# Patient Record
Sex: MALE | Race: WHITE | NOT HISPANIC OR LATINO | Employment: OTHER | RURAL
[De-identification: names, ages, dates, MRNs, and addresses within clinical notes are randomized per-mention and may not be internally consistent; named-entity substitution may affect disease eponyms.]

---

## 2023-11-06 DIAGNOSIS — K51.90 ULCERATIVE COLITIS WITHOUT COMPLICATIONS: Primary | ICD-10-CM

## 2023-11-10 ENCOUNTER — OFFICE VISIT (OUTPATIENT)
Dept: GASTROENTEROLOGY | Facility: CLINIC | Age: 33
End: 2023-11-10
Payer: OTHER GOVERNMENT

## 2023-11-10 VITALS
HEART RATE: 68 BPM | BODY MASS INDEX: 27.57 KG/M2 | SYSTOLIC BLOOD PRESSURE: 123 MMHG | WEIGHT: 208 LBS | HEIGHT: 73 IN | DIASTOLIC BLOOD PRESSURE: 69 MMHG

## 2023-11-10 DIAGNOSIS — K51.90 ULCERATIVE COLITIS WITHOUT COMPLICATIONS, UNSPECIFIED LOCATION: ICD-10-CM

## 2023-11-10 PROCEDURE — 99203 OFFICE O/P NEW LOW 30 MIN: CPT | Mod: PBBFAC | Performed by: NURSE PRACTITIONER

## 2023-11-10 PROCEDURE — 99204 OFFICE O/P NEW MOD 45 MIN: CPT | Mod: S$PBB,,, | Performed by: NURSE PRACTITIONER

## 2023-11-10 PROCEDURE — 99204 PR OFFICE/OUTPT VISIT, NEW, LEVL IV, 45-59 MIN: ICD-10-PCS | Mod: S$PBB,,, | Performed by: NURSE PRACTITIONER

## 2023-11-10 RX ORDER — MESALAMINE 1.2 G/1
2.4 TABLET, DELAYED RELEASE ORAL DAILY
COMMUNITY
Start: 2023-10-26

## 2023-11-10 NOTE — PROGRESS NOTES
Evaristo Ty is a 32 y.o. male here for No chief complaint on file.        PCP: Real Gama  Referring Provider: Real Gama,   [address] [place] [id number]     HPI:  Present for ulcerative colitis. Patient has a diagnosis of ulcerative colitis established by colonoscopy in 2022. Colonoscopy report from  [facility] reviewed, mild inflammation with aphthous ulcer in the cecum.  Low-grade indeterminate colitis on biopsy.  Patient also has had a small bowel capsule as well as EGD.  EGD was on 11/18/2022.  Patient reports that he is doing well with formed bowel movements.  Denies any hematochezia or melena.  No current abdominal pain.  No fevers.  No joint pain.  He is currently taking mesalamine 2.4 g once daily.  He is well controlled.  Patient is a  with the [employer].  Condition is controlled without any complications.          ROS:  Review of Systems   Constitutional:  Negative for activity change, appetite change, fatigue, fever and unexpected weight change.   Respiratory:  Negative for cough.    Cardiovascular:  Negative for chest pain.   Gastrointestinal:  Negative for abdominal distention, abdominal pain, blood in stool, change in bowel habit, constipation, diarrhea, nausea, vomiting and reflux.   Musculoskeletal:  Negative for gait problem.   Integumentary:  Negative for color change.   Psychiatric/Behavioral:  Negative for sleep disturbance. The patient is not nervous/anxious.           PMHX:  has a past medical history of Ulcerative colitis.    PSHX:  has a past surgical history that includes Colonoscopy (01/2023) and Esophagogastroduodenoscopy.    PFHX: family history includes Cancer in his maternal grandfather and maternal grandmother.    PSlHX:  reports that he has never smoked. He has never used smokeless tobacco. He reports that he does not currently use alcohol. He reports that he does not use drugs.        Review of patient's allergies indicates:  No Known  "Allergies    Medication List with Changes/Refills   Current Medications    MESALAMINE (LIALDA) 1.2 GRAM TBEC    Take 2.4 g by mouth once daily.        Objective Findings:  Vital Signs:  /69   Pulse 68   Ht 6' 1" (1.854 m)   Wt 94.3 kg (208 lb)   BMI 27.44 kg/m²  Body mass index is 27.44 kg/m².    Physical Exam:  Physical Exam  Vitals and nursing note reviewed.   Constitutional:       General: He is not in acute distress.     Appearance: Normal appearance.   HENT:      Mouth/Throat:      Mouth: Mucous membranes are moist.   Cardiovascular:      Rate and Rhythm: Normal rate.   Pulmonary:      Effort: Pulmonary effort is normal.      Breath sounds: No wheezing, rhonchi or rales.   Abdominal:      General: Bowel sounds are normal. There is no distension.      Palpations: Abdomen is soft. There is no mass.      Tenderness: There is no abdominal tenderness. There is no guarding.   Skin:     General: Skin is warm and dry.      Coloration: Skin is not jaundiced or pale.   Neurological:      Mental Status: He is alert and oriented to person, place, and time.   Psychiatric:         Mood and Affect: Mood normal.          Labs:  Lab Results   Component Value Date    WBC 4.50 11/10/2023    HGB 13.7 11/10/2023    HCT 39.8 (L) 11/10/2023    MCV 93.4 11/10/2023    RDW 12.8 11/10/2023     11/10/2023    LYMPH 44.2 (H) 11/10/2023    LYMPH 1.99 11/10/2023    MONO 13.1 (H) 11/10/2023    EOS 0.06 11/10/2023    BASO 0.04 11/10/2023     Lab Results   Component Value Date     11/10/2023    K 4.1 11/10/2023     11/10/2023    CO2 31 11/10/2023    GLU 90 11/10/2023    BUN 11 11/10/2023    CREATININE 0.98 11/10/2023    CALCIUM 9.6 11/10/2023    PROT 8.1 11/10/2023    ALBUMIN 4.1 11/10/2023    BILITOT 0.6 11/10/2023    ALKPHOS 61 11/10/2023    AST 33 11/10/2023    ALT 24 11/10/2023         Imaging: No results found.      Assessment:  Evaristo Ty is a 32 y.o. male here with:  1. Ulcerative colitis without " complications, unspecified location          Recommendations:  1. CBC, CMP, CRP, stool for calprotectin  2. Continue mesalamine 2.4 g daily  3. Avoid NSAID's  4. Follow up in 6 months    No follow-ups on file.      Order summary:  Orders Placed This Encounter    CBC Auto Differential    Comprehensive Metabolic Panel    C-Reactive Protein    Calprotectin, Stool       Thank you for allowing me to participate in the care of Evaristo Starks Karson.      WAYNE MckenzieC

## 2023-12-05 ENCOUNTER — PATIENT MESSAGE (OUTPATIENT)
Dept: GASTROENTEROLOGY | Facility: CLINIC | Age: 33
End: 2023-12-05

## 2023-12-05 NOTE — TELEPHONE ENCOUNTER
Patient asking the name of antibiotic you suggested for UC flair? I don't see any mention of that in your note. Just the mesalamine. Please advise?

## 2023-12-06 ENCOUNTER — PATIENT MESSAGE (OUTPATIENT)
Dept: GASTROENTEROLOGY | Facility: CLINIC | Age: 33
End: 2023-12-06

## 2023-12-20 ENCOUNTER — OFFICE VISIT (OUTPATIENT)
Dept: GASTROENTEROLOGY | Facility: CLINIC | Age: 33
End: 2023-12-20
Payer: OTHER GOVERNMENT

## 2023-12-20 VITALS
HEIGHT: 73 IN | BODY MASS INDEX: 27.14 KG/M2 | HEART RATE: 82 BPM | SYSTOLIC BLOOD PRESSURE: 133 MMHG | DIASTOLIC BLOOD PRESSURE: 77 MMHG | WEIGHT: 204.81 LBS

## 2023-12-20 DIAGNOSIS — K51.90 ULCERATIVE COLITIS WITHOUT COMPLICATIONS, UNSPECIFIED LOCATION: ICD-10-CM

## 2023-12-20 DIAGNOSIS — R10.32 LEFT LOWER QUADRANT ABDOMINAL PAIN: Primary | ICD-10-CM

## 2023-12-20 DIAGNOSIS — K51.919 ULCERATIVE COLITIS WITH COMPLICATION, UNSPECIFIED LOCATION: Primary | ICD-10-CM

## 2023-12-20 DIAGNOSIS — R10.32 LEFT LOWER QUADRANT ABDOMINAL PAIN: ICD-10-CM

## 2023-12-20 PROCEDURE — 99213 OFFICE O/P EST LOW 20 MIN: CPT | Mod: PBBFAC | Performed by: NURSE PRACTITIONER

## 2023-12-20 PROCEDURE — 99214 PR OFFICE/OUTPT VISIT, EST, LEVL IV, 30-39 MIN: ICD-10-PCS | Mod: S$PBB,,, | Performed by: NURSE PRACTITIONER

## 2023-12-20 PROCEDURE — 99214 OFFICE O/P EST MOD 30 MIN: CPT | Mod: S$PBB,,, | Performed by: NURSE PRACTITIONER

## 2023-12-20 RX ORDER — METRONIDAZOLE 500 MG/1
500 TABLET ORAL EVERY 12 HOURS
Qty: 20 TABLET | Refills: 0 | Status: SHIPPED | OUTPATIENT
Start: 2023-12-20 | End: 2023-12-30

## 2023-12-20 NOTE — PROGRESS NOTES
"    Evaristo Ty is a 33 y.o. male here for No chief complaint on file.        PCP: Real Gama  Referring Provider: No referring provider defined for this encounter.     HPI:  Presents for Ulcerative Colitis. States that he has been in a flare since the beginning of December.  States that he is unable to fly at this time due to abdominal pain.  Reports he is having left lower quadrant abdominal pain that is constant and worse at times.  States that it feels like he has been "hit in the gut".  States that this pain is not consistent with pain from previous flares.  He is currently taking mesalamine 4.8 g daily.  States that stool is loose at times and occasionally formed.Patient has a diagnosis of ulcerative colitis established by colonoscopy in 2022.  At the time of the colonoscopy the were some skip lesions in the cecum.  There was suspected Crohn's disease but the biopsy did not confirm.  Colonoscopy report from  [facility] reviewed, mild inflammation with aphthous ulcer in the cecum. Low-grade indeterminate colitis on biopsy. Patient also has had a small bowel capsule as well as EGD.  Small bowel capsule did not show any evidence of small-bowel disease at that time.  EGD was on 11/18/2022.  Reports that he was prescribed prednisone yesterday by the flight surgeon at [facility].  Has not yet started taking the medication.  We did discuss adding Flagyl twice daily for 10 days.  He did turn in a stool for calprotectin on yesterday but results have not returned.  Very minimal anemia on labs in November.  CRP was negative, liver enzymes were normal.          ROS:  Review of Systems   Constitutional:  Negative for activity change, appetite change, fatigue, fever and unexpected weight change.   HENT:  Negative for trouble swallowing.    Gastrointestinal:  Positive for abdominal pain and diarrhea. Negative for abdominal distention, blood in stool, change in bowel habit, constipation, nausea, vomiting and " "reflux.   Musculoskeletal:  Negative for gait problem.   Integumentary:  Negative for color change.   Psychiatric/Behavioral:  Negative for sleep disturbance. The patient is not nervous/anxious.           PMHX:  has a past medical history of Ulcerative colitis.    PSHX:  has a past surgical history that includes Colonoscopy (01/2023) and Esophagogastroduodenoscopy.    PFHX: family history includes Cancer in his maternal grandfather and maternal grandmother.    PSlHX:  reports that he has never smoked. He has never used smokeless tobacco. He reports that he does not currently use alcohol. He reports that he does not use drugs.        Review of patient's allergies indicates:  No Known Allergies    Medication List with Changes/Refills   New Medications    METRONIDAZOLE (FLAGYL) 500 MG TABLET    Take 1 tablet (500 mg total) by mouth every 12 (twelve) hours. for 10 days   Current Medications    MESALAMINE (LIALDA) 1.2 GRAM TBEC    Take 2.4 g by mouth once daily.        Objective Findings:  Vital Signs:  /77   Pulse 82   Ht 6' 1" (1.854 m)   Wt 92.9 kg (204 lb 12.8 oz)   BMI 27.02 kg/m²  Body mass index is 27.02 kg/m².    Physical Exam:  Physical Exam  Vitals and nursing note reviewed.   Constitutional:       General: He is not in acute distress.     Appearance: Normal appearance.   HENT:      Mouth/Throat:      Mouth: Mucous membranes are moist.   Cardiovascular:      Rate and Rhythm: Normal rate.   Pulmonary:      Effort: Pulmonary effort is normal.      Breath sounds: No wheezing, rhonchi or rales.   Abdominal:      General: Bowel sounds are normal. There is no distension.      Palpations: Abdomen is soft. There is no mass.      Tenderness: There is abdominal tenderness in the left lower quadrant. There is no guarding.   Skin:     General: Skin is warm and dry.      Coloration: Skin is not jaundiced or pale.   Neurological:      Mental Status: He is alert and oriented to person, place, and time. "   Psychiatric:         Mood and Affect: Mood normal.          Labs:  Lab Results   Component Value Date    WBC 9.65 12/20/2023    HGB 13.3 (L) 12/20/2023    HCT 39.4 (L) 12/20/2023    MCV 95.6 12/20/2023    RDW 12.7 12/20/2023     12/20/2023    LYMPH 19.0 (L) 12/20/2023    LYMPH 1.83 12/20/2023    MONO 10.5 (H) 12/20/2023    EOS 0.10 12/20/2023    BASO 0.05 12/20/2023     Lab Results   Component Value Date     11/10/2023    K 4.1 11/10/2023     11/10/2023    CO2 31 11/10/2023    GLU 90 11/10/2023    BUN 11 11/10/2023    CREATININE 0.98 11/10/2023    CALCIUM 9.6 11/10/2023    PROT 8.1 11/10/2023    ALBUMIN 4.1 11/10/2023    BILITOT 0.6 11/10/2023    ALKPHOS 61 11/10/2023    AST 33 11/10/2023    ALT 24 11/10/2023         Imaging: No results found.      Assessment:  Evaristo Ty is a 33 y.o. male here with:  1. Ulcerative colitis with complication, unspecified location    2. Ulcerative colitis without complications, unspecified location    3. Left lower quadrant abdominal pain          Recommendations:  1. MR enterography  2. Continue mesalamine 4.8 g daily  3. Flagyl 500 mg b.i.d. times 10 days  4. May consider taking prednisone  5. Is not taking NSAID's  6. Labs below today    Follow up in about 4 weeks (around 1/17/2024).      Order summary:  Orders Placed This Encounter    CT Abdomen Pelvis With IV Contrast Routine Oral Contrast    CBC Auto Differential    C-Reactive Protein    Comprehensive Metabolic Panel    Iron and TIBC    Ferritin    metroNIDAZOLE (FLAGYL) 500 MG tablet       Thank you for allowing me to participate in the care of Evaristo Ty.      DANAE Mckenzie

## 2023-12-21 ENCOUNTER — PATIENT MESSAGE (OUTPATIENT)
Dept: GASTROENTEROLOGY | Facility: CLINIC | Age: 33
End: 2023-12-21

## 2023-12-21 DIAGNOSIS — D50.9 IRON DEFICIENCY ANEMIA, UNSPECIFIED IRON DEFICIENCY ANEMIA TYPE: Primary | ICD-10-CM

## 2023-12-21 DIAGNOSIS — K51.919 ULCERATIVE COLITIS WITH COMPLICATION, UNSPECIFIED LOCATION: ICD-10-CM

## 2023-12-28 ENCOUNTER — ANESTHESIA (OUTPATIENT)
Dept: GASTROENTEROLOGY | Facility: HOSPITAL | Age: 33
End: 2023-12-28
Payer: OTHER GOVERNMENT

## 2023-12-28 ENCOUNTER — ANESTHESIA EVENT (OUTPATIENT)
Dept: GASTROENTEROLOGY | Facility: HOSPITAL | Age: 33
End: 2023-12-28
Payer: OTHER GOVERNMENT

## 2023-12-28 ENCOUNTER — HOSPITAL ENCOUNTER (OUTPATIENT)
Dept: GASTROENTEROLOGY | Facility: HOSPITAL | Age: 33
Discharge: HOME | End: 2023-12-28
Attending: NURSE PRACTITIONER
Payer: OTHER GOVERNMENT

## 2023-12-28 VITALS
DIASTOLIC BLOOD PRESSURE: 65 MMHG | SYSTOLIC BLOOD PRESSURE: 114 MMHG | RESPIRATION RATE: 13 BRPM | TEMPERATURE: 98 F | OXYGEN SATURATION: 99 % | HEART RATE: 65 BPM

## 2023-12-28 DIAGNOSIS — D50.9 IRON DEFICIENCY ANEMIA, UNSPECIFIED IRON DEFICIENCY ANEMIA TYPE: ICD-10-CM

## 2023-12-28 DIAGNOSIS — K52.9 IBD (INFLAMMATORY BOWEL DISEASE): Primary | ICD-10-CM

## 2023-12-28 PROCEDURE — 88305 TISSUE EXAM BY PATHOLOGIST: CPT | Mod: 26,,, | Performed by: PATHOLOGY

## 2023-12-28 PROCEDURE — 00811 ANES LWR INTST NDSC NOS: CPT

## 2023-12-28 PROCEDURE — D9220A PRA ANESTHESIA: ICD-10-PCS | Mod: ,,, | Performed by: NURSE ANESTHETIST, CERTIFIED REGISTERED

## 2023-12-28 PROCEDURE — 45380 PR COLONOSCOPY,BIOPSY: ICD-10-PCS | Mod: ,,, | Performed by: INTERNAL MEDICINE

## 2023-12-28 PROCEDURE — 27000716 HC OXISENSOR PROBE, ANY SIZE: Performed by: NURSE ANESTHETIST, CERTIFIED REGISTERED

## 2023-12-28 PROCEDURE — 25000003 PHARM REV CODE 250: Performed by: NURSE ANESTHETIST, CERTIFIED REGISTERED

## 2023-12-28 PROCEDURE — D9220A PRA ANESTHESIA: Mod: ,,, | Performed by: NURSE ANESTHETIST, CERTIFIED REGISTERED

## 2023-12-28 PROCEDURE — 45380 COLONOSCOPY AND BIOPSY: CPT | Mod: ,,, | Performed by: INTERNAL MEDICINE

## 2023-12-28 PROCEDURE — 37000008 HC ANESTHESIA 1ST 15 MINUTES

## 2023-12-28 PROCEDURE — 88305 TISSUE EXAM BY PATHOLOGIST: CPT | Mod: TC,SUR | Performed by: INTERNAL MEDICINE

## 2023-12-28 PROCEDURE — 45380 COLONOSCOPY AND BIOPSY: CPT | Performed by: INTERNAL MEDICINE

## 2023-12-28 PROCEDURE — 63600175 PHARM REV CODE 636 W HCPCS: Performed by: NURSE ANESTHETIST, CERTIFIED REGISTERED

## 2023-12-28 PROCEDURE — 27000284 HC CANNULA NASAL: Performed by: NURSE ANESTHETIST, CERTIFIED REGISTERED

## 2023-12-28 PROCEDURE — 88305 SURGICAL PATHOLOGY: ICD-10-PCS | Mod: 26,,, | Performed by: PATHOLOGY

## 2023-12-28 PROCEDURE — 27201423 OPTIME MED/SURG SUP & DEVICES STERILE SUPPLY

## 2023-12-28 PROCEDURE — 37000009 HC ANESTHESIA EA ADD 15 MINS

## 2023-12-28 RX ORDER — LIDOCAINE HYDROCHLORIDE 20 MG/ML
INJECTION, SOLUTION EPIDURAL; INFILTRATION; INTRACAUDAL; PERINEURAL
Status: DISCONTINUED | OUTPATIENT
Start: 2023-12-28 | End: 2023-12-28

## 2023-12-28 RX ORDER — PROPOFOL 10 MG/ML
VIAL (ML) INTRAVENOUS
Status: DISCONTINUED | OUTPATIENT
Start: 2023-12-28 | End: 2023-12-28

## 2023-12-28 RX ORDER — SODIUM CHLORIDE 0.9 % (FLUSH) 0.9 %
10 SYRINGE (ML) INJECTION
Status: DISCONTINUED | OUTPATIENT
Start: 2023-12-28 | End: 2023-12-29 | Stop reason: HOSPADM

## 2023-12-28 RX ADMIN — SODIUM CHLORIDE: 9 INJECTION, SOLUTION INTRAVENOUS at 09:12

## 2023-12-28 RX ADMIN — PROPOFOL 100 MG: 10 INJECTION, EMULSION INTRAVENOUS at 09:12

## 2023-12-28 RX ADMIN — LIDOCAINE HYDROCHLORIDE 100 MG: 20 INJECTION, SOLUTION INTRAVENOUS at 09:12

## 2023-12-28 NOTE — H&P
[facility]  Gastroenterology  H&P    Patient Name: Evaristo Ty  MRN: [id number]  Admission Date: 12/28/2023  Code Status: No Order    Attending Provider: Charla Madrid FNP   Primary Care Physician: Real Gama DO  Principal Problem:<principal problem not specified>    Subjective:     History of Present Illness: Pt has IBD with flare and iron deficiency anemia; for colonoscopy.    Past Medical History:   Diagnosis Date    Ulcerative colitis        Past Surgical History:   Procedure Laterality Date    COLONOSCOPY  01/2023    ESOPHAGOGASTRODUODENOSCOPY         Review of patient's allergies indicates:  No Known Allergies  Family History       Problem Relation (Age of Onset)    Cancer Maternal Grandmother, Maternal Grandfather          Tobacco Use    Smoking status: Never    Smokeless tobacco: Never   Substance and Sexual Activity    Alcohol use: Not Currently    Drug use: Never    Sexual activity: Not on file     Review of Systems   Respiratory: Negative.     Cardiovascular: Negative.    Gastrointestinal:  Positive for abdominal pain and diarrhea.     Objective:     Vital Signs (Most Recent):    Vital Signs (24h Range):           There is no height or weight on file to calculate BMI.    No intake or output data in the 24 hours ending 12/28/23 0858    Lines/Drains/Airways       None                   Physical Exam  Vitals reviewed.   Constitutional:       General: He is not in acute distress.     Appearance: Normal appearance. He is well-developed. He is not ill-appearing.   HENT:      Head: Normocephalic and atraumatic.      Nose: Nose normal.   Eyes:      Pupils: Pupils are equal, round, and reactive to light.   Cardiovascular:      Rate and Rhythm: Normal rate and regular rhythm.   Pulmonary:      Effort: Pulmonary effort is normal.      Breath sounds: Normal breath sounds. No wheezing.   Abdominal:      General: Abdomen is flat. Bowel sounds are normal. There is no distension.       "Palpations: Abdomen is soft.      Tenderness: There is no abdominal tenderness. There is no guarding.   Skin:     General: Skin is warm and dry.      Coloration: Skin is not jaundiced.   Neurological:      Mental Status: He is alert.   Psychiatric:         Attention and Perception: Attention normal.         Mood and Affect: Affect normal.         Speech: Speech normal.         Behavior: Behavior is cooperative.      Comments: Pt was calm while speaking.         Significant Labs:  CBC: No results for input(s): "WBC", "HGB", "HCT", "PLT" in the last 48 hours.  CMP: No results for input(s): "GLU", "CALCIUM", "ALBUMIN", "PROT", "NA", "K", "CO2", "CL", "BUN", "CREATININE", "ALKPHOS", "ALT", "AST", "BILITOT" in the last 48 hours.    Significant Imaging:  Imaging results within the past 24 hours have been reviewed.    Assessment/Plan:     There are no hospital problems to display for this patient.        Imp: IBD, iron deficiency anemia  Plan: colonoscopy    Nathen Morris MD  Gastroenterology  [facility]  "

## 2023-12-28 NOTE — ANESTHESIA PREPROCEDURE EVALUATION
12/28/2023  Evaristo Ty is a 33 y.o., male.      Pre-op Assessment    I have reviewed the Patient Summary Reports.     I have reviewed the Nursing Notes. I have reviewed the NPO Status.   I have reviewed the Medications.     Review of Systems  Hematology/Oncology:  Hematology Normal   Oncology Normal                                   EENT/Dental:  EENT/Dental Normal           Cardiovascular:  Cardiovascular Normal                                            Pulmonary:  Pulmonary Normal                       Renal/:  Renal/ Normal                 Hepatic/GI:   PUD,     Ulcerative colitis          Musculoskeletal:  Musculoskeletal Normal                Neurological:  Neurology Normal                                      Endocrine:  Endocrine Normal            Dermatological:  Skin Normal    Psych:  Psychiatric Normal                    Physical Exam  General: Well nourished, Cooperative, Alert and Oriented    Airway:  Mallampati: II   Mouth Opening: Normal  TM Distance: Normal  Tongue: Normal  Neck ROM: Normal ROM    Dental:  Intact        Anesthesia Plan  Type of Anesthesia, risks & benefits discussed:    Anesthesia Type: Gen Natural Airway, MAC  Intra-op Monitoring Plan: Standard ASA Monitors  Post Op Pain Control Plan: multimodal analgesia and IV/PO Opioids PRN  Induction:  IV  Informed Consent: Informed consent signed with the Patient and all parties understand the risks and agree with anesthesia plan.  All questions answered. Patient consented to blood products? Yes  ASA Score: 2  Day of Surgery Review of History & Physical: I have interviewed and examined the patient. I have reviewed the patient's H&P dated: There are no significant changes.     Ready For Surgery From Anesthesia Perspective.     .  Past Medical History:   Diagnosis Date    Ulcerative colitis        Past Surgical History:    Procedure Laterality Date    COLONOSCOPY  01/2023    ESOPHAGOGASTRODUODENOSCOPY         Family History   Problem Relation Age of Onset    Cancer Maternal Grandmother     Cancer Maternal Grandfather        Social History     Socioeconomic History    Marital status:    Tobacco Use    Smoking status: Never    Smokeless tobacco: Never   Substance and Sexual Activity    Alcohol use: Not Currently    Drug use: Never       Current Outpatient Medications   Medication Sig Dispense Refill    mesalamine (LIALDA) 1.2 gram TbEC Take 2.4 g by mouth once daily.      metroNIDAZOLE (FLAGYL) 500 MG tablet Take 1 tablet (500 mg total) by mouth every 12 (twelve) hours. for 10 days 20 tablet 0     No current facility-administered medications for this encounter.       Review of patient's allergies indicates:  No Known Allergies     Patient Active Problem List   Diagnosis    Ulcerative colitis with complication    Left lower quadrant abdominal pain

## 2023-12-28 NOTE — DISCHARGE INSTRUCTIONS
Procedure Date  12/28/23     Impression  Overall Impression:   Performed forceps biopsies in the terminal ileum  Performed forceps biopsies in the ascending colon, transverse colon, descending colon, sigmoid colon and rectum  Mucosa of the colon and terminal ileum is normal appearing; multiple biopsies were obtained.     Recommendation    Await pathology results     Repeat colonoscopy in 5 years      Resume mesalamine;     return to GI clinic after MR enterography. - Jan. 17, 2024 @ 3:00 pm     Avoid nsaids.  Take otc iron 325mg/day.    Discharge: disp; DC to home. Resume diet. No driving x 24 hours. F/U with PCP and GI clinic.  Dx: IBD with no gross evidence of colitis.    No driving today, no operating heavy machinery, no signing any legal documents until tomorrow.    Drink lots of fluids, resume regular diet.  Take your normal medications.     Please call our office for any nausea, vomiting or abdominal pain.

## 2023-12-28 NOTE — TRANSFER OF CARE
Anesthesia Transfer of Care Note    Patient: Evaristo Ty    Procedure(s) Performed: * No procedures listed *    Patient location: GI    Anesthesia Type: general    Transport from OR: Transported from OR on room air with adequate spontaneous ventilation. Continuous ECG monitoring in transport. Continuous SpO2 monitoring in transport    Post pain: adequate analgesia    Post assessment: no apparent anesthetic complications    Post vital signs: stable    Level of consciousness: sedated and responds to stimulation    Nausea/Vomiting: no nausea/vomiting    Complications: none    Transfer of care protocol was followedComments: Good SV continue, NAD, VSS, RTRN      Last vitals: Visit Vitals  BP 94/60 (BP Location: Left arm, Patient Position: Lying)   Pulse 68   Temp 36.5 °C (97.7 °F) (Oral)   Resp 16   SpO2 96%

## 2023-12-28 NOTE — ANESTHESIA POSTPROCEDURE EVALUATION
Anesthesia Post Evaluation    Patient: Evaristo Ty    Procedure(s) Performed: Colonoscopy    Final Anesthesia Type: general      Patient location during evaluation: GI PACU  Patient participation: Yes- Able to Participate  Level of consciousness: awake and alert  Post-procedure vital signs: reviewed and stable  Pain management: adequate  Airway patency: patent    PONV status at discharge: No PONV  Anesthetic complications: no      Cardiovascular status: blood pressure returned to baseline and hemodynamically stable  Respiratory status: spontaneous ventilation  Hydration status: euvolemic  Follow-up not needed.  Comments: Refer to nursing notes for pain/esther score upon discharge from recovery.              Vitals Value Taken Time   /68 12/28/23 0957   Temp 97.6 12/28/23 1302   Pulse 66 12/28/23 0958   Resp 18 12/28/23 0958   SpO2 98 % 12/28/23 0958   Vitals shown include unvalidated device data.      Event Time   Out of Recovery 10:35:13         Pain/Esther Score: Esther Score: 9 (12/28/2023  9:38 AM)

## 2023-12-29 LAB
ESTROGEN SERPL-MCNC: NORMAL PG/ML
INSULIN SERPL-ACNC: NORMAL U[IU]/ML
LAB AP GROSS DESCRIPTION: NORMAL
LAB AP LABORATORY NOTES: NORMAL
T3RU NFR SERPL: NORMAL %

## 2024-01-02 ENCOUNTER — PATIENT MESSAGE (OUTPATIENT)
Dept: GASTROENTEROLOGY | Facility: CLINIC | Age: 34
End: 2024-01-02
Payer: OTHER GOVERNMENT

## 2024-01-03 ENCOUNTER — PATIENT MESSAGE (OUTPATIENT)
Dept: GASTROENTEROLOGY | Facility: CLINIC | Age: 34
End: 2024-01-03
Payer: OTHER GOVERNMENT

## 2024-01-17 ENCOUNTER — OFFICE VISIT (OUTPATIENT)
Dept: GASTROENTEROLOGY | Facility: CLINIC | Age: 34
End: 2024-01-17
Payer: OTHER GOVERNMENT

## 2024-01-17 VITALS
HEIGHT: 73 IN | HEART RATE: 74 BPM | DIASTOLIC BLOOD PRESSURE: 69 MMHG | BODY MASS INDEX: 27.41 KG/M2 | SYSTOLIC BLOOD PRESSURE: 142 MMHG | WEIGHT: 206.81 LBS

## 2024-01-17 DIAGNOSIS — K51.919 ULCERATIVE COLITIS WITH COMPLICATION, UNSPECIFIED LOCATION: Primary | ICD-10-CM

## 2024-01-17 DIAGNOSIS — D64.9 ANEMIA, UNSPECIFIED TYPE: ICD-10-CM

## 2024-01-17 DIAGNOSIS — D50.9 IRON DEFICIENCY ANEMIA, UNSPECIFIED IRON DEFICIENCY ANEMIA TYPE: ICD-10-CM

## 2024-01-17 DIAGNOSIS — R10.32 LEFT LOWER QUADRANT ABDOMINAL PAIN: ICD-10-CM

## 2024-01-17 PROCEDURE — 99213 OFFICE O/P EST LOW 20 MIN: CPT | Mod: PBBFAC | Performed by: NURSE PRACTITIONER

## 2024-01-17 PROCEDURE — 99214 OFFICE O/P EST MOD 30 MIN: CPT | Mod: S$PBB,,, | Performed by: NURSE PRACTITIONER

## 2024-01-17 RX ORDER — FEXOFENADINE HYDROCHLORIDE 180 MG/1
180 TABLET, FILM COATED ORAL DAILY
COMMUNITY
Start: 2023-11-16

## 2024-01-17 NOTE — PROGRESS NOTES
Evaristo Ty is a 33 y.o. male here for No chief complaint on file.        PCP: Real Gama  Referring Provider: No referring provider defined for this encounter.     HPI:  Presents for follow-up after colonoscopy.  Colonoscopy on 12/28/2023, report reviewed, negative for colitis in the ascending, transverse, descending, sigmoid, and rectum.  No inflammation noted.  Patient also had MRI enterography on 12/29, under distention noted in the rectum.  This was following colonoscopy the day prior.  No inflammation was noted in the rectum.  Previous calprotectin was 106.  CRP on 12/20 was 4.19.  He did have iron-deficiency.  Total iron level was 34 and saturation was 12.  Minimal anemia with HGB 13.3 and HCT 39.4.  Patient has been keeping a diary of bowel movements.  Reports 2-3 bowel movements per day.  Stool is formed.  He reports intermittent abdominal bloating and gas.  Associates some triggers including stress and carbonated drinks.  One week prior to colonoscopy he was started on prednisone 20 mg daily.  Feels that this may have reduced inflammation.  Patient describes intense intermittent flares of abdominal pain.  Last flare several weeks ago.  States that it was triggered by drinking a carbonated drink.  Pain was described as burning and generalized in the entire abdomen.  Reports that he has following a bland diet.  No previous association with gluten sensitivity.  No known testing for celiac disease.  No current hematochezia or melena.  He is taking mesalamine 2.4 g daily. Patient has a diagnosis of ulcerative colitis established by colonoscopy in 2022.  At the time of the colonoscopy the were some skip lesions in the cecum.  There was suspected Crohn's disease but the biopsy did not confirm.  Colonoscopy report from  [facility] reviewed, mild inflammation with aphthous ulcer in the cecum. Low-grade indeterminate colitis on biopsy. Patient also has had a small bowel capsule as well as  "EGD.  Small bowel capsule did not show any evidence of small-bowel disease at that time.  EGD was on 11/18/2022.           ROS:  Review of Systems   Constitutional:  Negative for activity change, appetite change, fatigue, fever and unexpected weight change.   HENT:  Negative for trouble swallowing.    Gastrointestinal:  Positive for abdominal distention (intermittent gas bloating) and abdominal pain (intermittent). Negative for blood in stool, change in bowel habit, constipation, diarrhea, nausea, vomiting and reflux.   Musculoskeletal:  Negative for gait problem.   Integumentary:  Negative for color change.   Psychiatric/Behavioral:  Negative for sleep disturbance. The patient is not nervous/anxious.           PMHX:  has a past medical history of Ulcerative colitis.    PSHX:  has a past surgical history that includes Colonoscopy (01/2023) and Esophagogastroduodenoscopy.    PFHX: family history includes Cancer in his maternal grandfather and maternal grandmother.    PSlHX:  reports that he has never smoked. He has never used smokeless tobacco. He reports that he does not currently use alcohol. He reports that he does not use drugs.        Review of patient's allergies indicates:  No Known Allergies    Medication List with Changes/Refills   Current Medications    ALLEGRA ALLERGY 180 MG TABLET    Take 180 mg by mouth once daily.    MESALAMINE (LIALDA) 1.2 GRAM TBEC    Take 2.4 g by mouth once daily.        Objective Findings:  Vital Signs:  BP (!) 142/69   Pulse 74   Ht 6' 1" (1.854 m)   Wt 93.8 kg (206 lb 12.8 oz)   BMI 27.28 kg/m²  Body mass index is 27.28 kg/m².    Physical Exam:  Physical Exam  Vitals and nursing note reviewed.   Constitutional:       General: He is not in acute distress.     Appearance: Normal appearance.   HENT:      Mouth/Throat:      Mouth: Mucous membranes are moist.   Cardiovascular:      Rate and Rhythm: Normal rate.   Pulmonary:      Effort: Pulmonary effort is normal.      Breath " sounds: No wheezing, rhonchi or rales.   Abdominal:      General: Bowel sounds are normal. There is no distension.      Palpations: Abdomen is soft. There is no mass.      Tenderness: There is no abdominal tenderness. There is no guarding.      Hernia: No hernia is present.   Skin:     General: Skin is warm and dry.      Coloration: Skin is not jaundiced or pale.   Neurological:      Mental Status: He is alert and oriented to person, place, and time.   Psychiatric:         Mood and Affect: Mood normal.          Labs:  Lab Results   Component Value Date    WBC 9.65 12/20/2023    HGB 13.3 (L) 12/20/2023    HCT 39.4 (L) 12/20/2023    MCV 95.6 12/20/2023    RDW 12.7 12/20/2023     12/20/2023    LYMPH 19.0 (L) 12/20/2023    LYMPH 1.83 12/20/2023    MONO 10.5 (H) 12/20/2023    EOS 0.10 12/20/2023    BASO 0.05 12/20/2023     Lab Results   Component Value Date     12/20/2023    K 4.2 12/20/2023     12/20/2023    CO2 27 12/20/2023    GLU 97 12/20/2023    BUN 13 12/20/2023    CREATININE 1.11 12/20/2023    CALCIUM 9.5 12/20/2023    PROT 8.0 12/20/2023    ALBUMIN 3.9 12/20/2023    BILITOT 0.3 12/20/2023    ALKPHOS 83 12/20/2023    AST 26 12/20/2023    ALT 17 12/20/2023         Imaging: MRI Enterography (XPD)    Result Date: 12/29/2023  EXAMINATION: MRI ENTEROGRAPHY (XPD) CLINICAL HISTORY: Left lower quadrant pain COMPARISON: None TECHNIQUE: Multiplanar multisequence magnetic resonance imaging of the abdomen and pelvis performed before and after the administration of 19 cc MultiHance intravenous contrast.  MRI enterography protocol followed. FINDINGS: No worrisome focal hepatic abnormality demonstrated on submitted images.  Visualized gallbladder grossly unremarkable.  Visualized pancreas appears unremarkable.  Spleen grossly unremarkable.  Bilateral adrenal glands grossly unremarkable.  Bilateral kidneys appear grossly unremarkable.  Urinary bladder incompletely distended.  Prostate and seminal vesicles  grossly unremarkable.  No convincing evidence of gastrointestinal obstruction or acute appendicitis.  There is some under distension of the rectum with some mucosal enhancement suspicious for proctitis.  Consider colonoscopy.  No other convincing abnormal bowel wall thickening or enhancement .  Vasculature grossly unremarkable.  Visualized osseous and surrounding soft tissue structures demonstrate no acute abnormality.     There is some under distension of the rectum with some mucosal enhancement suspicious for proctitis.  Consider colonoscopy. No other convincing abnormal bowel wall thickening or enhancement . Point of Service: [facility] Electronically signed by: Iain Benítez Date:    12/29/2023 Time:    11:59    Colonoscopy    Result Date: 12/28/2023  Table formatting from the original result was not included. Procedure Date 12/28/23 Impression Overall      Performed forceps biopsies in the terminal ileum Performed forceps biopsies in the ascending colon, transverse colon, descending colon, sigmoid colon and rectum Mucosa of the colon and terminal ileum is normal appearing; multiple biopsies were obtained. Recommendation  Await pathology results  Repeat colonoscopy in 5 years Resume mesalamine; return to GI clinic after MR enterography. Avoid nsaids.  Take otc iron 325mg/day. Discharge: disp; DC to home. Resume diet. No driving x 24 hours. F/U with PCP and GI clinic. Dx: IBD with no gross evidence of colitis. Indication Iron deficiency anemia, unspecified iron deficiency anemia type Providers Neo Callejas CRNA CRNA Cargile, Amber N Technician Shanna Frankel RN Registered Nurse Nathen Morris MD Proceduralist Medications Moderate sedation administered by anesthesia staff - See anesthesia record. Preprocedure A history and physical has been performed, and patient medication allergies have been reviewed. The patient's tolerance of previous anesthesia has been reviewed. The risks and  benefits of the procedure and the sedation options and risks were discussed with the patient. All questions were answered and informed consent obtained. ASA Score: ASA 2 - Patient with mild systemic disease with no functional limitations Mallampati Airway Score: II (hard and soft palate, upper portion of tonsils anduvula visible) Details of the Procedure The patient underwent monitored anesthesia care, which was administered by an anesthesia professional. The patient's heart rate, blood pressure, level of consciousness, respirations, oxygen, ECG and ETCO2 were monitored throughout the procedure. A digital rectal exam was performed. A perianal exam was performed. The scope was introduced through the anus and advanced to the terminal ileum. Retroflexion was performed in the rectum. The quality of bowel preparation was evaluated using the [place] Bowel Preparation Scale with scores of: right colon = 2, transverse colon = 2, left colon = 2. The total BBPS score was 6. Bowel prep was adequate. The patient's estimated blood loss was minimal (<5 mL). The procedure was not difficult. The patient tolerated the procedure well. There were no apparent adverse events. Scope: Colonoscope Scope Serial: [id number] Events Procedure Events Event Event Time Procedure Events Event Event Time ENDO SCOPE IN TIME 12/28/2023  9:17 AM ENDO CECUM REACHED 12/28/2023  9:25 AM ENDO SCOPE OUT TIME 12/28/2023  9:33 AM CECAL WITHDRAWAL TIME: 8m 01s Findings Performed multiple forceps biopsies in the terminal ileum Performed multiple forceps biopsies in the ascending colon, transverse colon, descending colon, sigmoid colon and rectum         Assessment:  Evaristo Ty is a 33 y.o. male here with:  1. Ulcerative colitis with complication, unspecified location    2. Iron deficiency anemia, unspecified iron deficiency anemia type    3. Left lower quadrant abdominal pain    4. Anemia, unspecified type          Recommendations:  1. Celiac labs,  iron studies, H and H, CRP  2. Avoid NSAID's  3. Continue mesalamine at 2.4 g daily  4. Consider further evaluation following lab review    Follow up in about 4 weeks (around 2/14/2024).      Order summary:  Orders Placed This Encounter    Iron and TIBC    Ferritin    Hemoglobin and Hematocrit    C-Reactive Protein    IgA    Tissue Transglutaminase Ab, IgA    Endomysial antibody, IgA titer    Gliadin (Deamidated) Ab, Eval       Thank you for allowing me to participate in the care of Evaristo Ty.      WAYNE MckenzieC

## 2024-01-18 DIAGNOSIS — D50.9 IRON DEFICIENCY ANEMIA, UNSPECIFIED IRON DEFICIENCY ANEMIA TYPE: Primary | ICD-10-CM

## 2024-01-20 ENCOUNTER — PATIENT MESSAGE (OUTPATIENT)
Dept: GASTROENTEROLOGY | Facility: CLINIC | Age: 34
End: 2024-01-20
Payer: OTHER GOVERNMENT

## 2024-01-20 ENCOUNTER — HOSPITAL ENCOUNTER (EMERGENCY)
Facility: HOSPITAL | Age: 34
Discharge: HOME | End: 2024-01-20
Attending: EMERGENCY MEDICINE
Payer: OTHER GOVERNMENT

## 2024-01-20 VITALS
HEIGHT: 73 IN | BODY MASS INDEX: 27.3 KG/M2 | OXYGEN SATURATION: 97 % | TEMPERATURE: 98 F | RESPIRATION RATE: 14 BRPM | WEIGHT: 206 LBS | DIASTOLIC BLOOD PRESSURE: 70 MMHG | SYSTOLIC BLOOD PRESSURE: 123 MMHG | HEART RATE: 74 BPM

## 2024-01-20 DIAGNOSIS — K52.9 INFLAMMATORY BOWEL DISEASE: ICD-10-CM

## 2024-01-20 DIAGNOSIS — R10.9 ABDOMINAL PAIN, UNSPECIFIED ABDOMINAL LOCATION: Primary | ICD-10-CM

## 2024-01-20 LAB
ALBUMIN SERPL BCP-MCNC: 3.5 G/DL (ref 3.5–5)
ALBUMIN/GLOB SERPL: 0.8 {RATIO}
ALP SERPL-CCNC: 100 U/L (ref 45–115)
ALT SERPL W P-5'-P-CCNC: 28 U/L (ref 16–61)
ANION GAP SERPL CALCULATED.3IONS-SCNC: 8 MMOL/L (ref 7–16)
AST SERPL W P-5'-P-CCNC: 31 U/L (ref 15–37)
BASOPHILS # BLD AUTO: 0.04 K/UL (ref 0–0.2)
BASOPHILS NFR BLD AUTO: 0.3 % (ref 0–1)
BILIRUB SERPL-MCNC: 0.3 MG/DL (ref ?–1.2)
BUN SERPL-MCNC: 17 MG/DL (ref 7–18)
BUN/CREAT SERPL: 19 (ref 6–20)
CALCIUM SERPL-MCNC: 9.5 MG/DL (ref 8.5–10.1)
CHLORIDE SERPL-SCNC: 106 MMOL/L (ref 98–107)
CO2 SERPL-SCNC: 27 MMOL/L (ref 21–32)
CREAT SERPL-MCNC: 0.91 MG/DL (ref 0.7–1.3)
CRP SERPL-MCNC: 9.73 MG/DL (ref 0–0.8)
DIFFERENTIAL METHOD BLD: ABNORMAL
EGFR (NO RACE VARIABLE) (RUSH/TITUS): 113 ML/MIN/1.73M2
EOSINOPHIL # BLD AUTO: 0.09 K/UL (ref 0–0.5)
EOSINOPHIL NFR BLD AUTO: 0.8 % (ref 1–4)
ERYTHROCYTE [DISTWIDTH] IN BLOOD BY AUTOMATED COUNT: 12.3 % (ref 11.5–14.5)
GLOBULIN SER-MCNC: 4.2 G/DL (ref 2–4)
GLUCOSE SERPL-MCNC: 98 MG/DL (ref 74–106)
HCT VFR BLD AUTO: 38.1 % (ref 40–54)
HGB BLD-MCNC: 13.1 G/DL (ref 13.5–18)
IMM GRANULOCYTES # BLD AUTO: 0.05 K/UL (ref 0–0.04)
IMM GRANULOCYTES NFR BLD: 0.4 % (ref 0–0.4)
LIPASE SERPL-CCNC: 35 U/L (ref 16–77)
LYMPHOCYTES # BLD AUTO: 1.59 K/UL (ref 1–4.8)
LYMPHOCYTES NFR BLD AUTO: 13.7 % (ref 27–41)
MCH RBC QN AUTO: 31.7 PG (ref 27–31)
MCHC RBC AUTO-ENTMCNC: 34.4 G/DL (ref 32–36)
MCV RBC AUTO: 92.3 FL (ref 80–96)
MONOCYTES # BLD AUTO: 1.18 K/UL (ref 0–0.8)
MONOCYTES NFR BLD AUTO: 10.2 % (ref 2–6)
MPC BLD CALC-MCNC: 9.5 FL (ref 9.4–12.4)
NEUTROPHILS # BLD AUTO: 8.64 K/UL (ref 1.8–7.7)
NEUTROPHILS NFR BLD AUTO: 74.6 % (ref 53–65)
NRBC # BLD AUTO: 0 X10E3/UL
NRBC, AUTO (.00): 0 %
PLATELET # BLD AUTO: 261 K/UL (ref 150–400)
POTASSIUM SERPL-SCNC: 3.6 MMOL/L (ref 3.5–5.1)
PROT SERPL-MCNC: 7.7 G/DL (ref 6.4–8.2)
RBC # BLD AUTO: 4.13 M/UL (ref 4.6–6.2)
SODIUM SERPL-SCNC: 137 MMOL/L (ref 136–145)
WBC # BLD AUTO: 11.59 K/UL (ref 4.5–11)

## 2024-01-20 PROCEDURE — 80053 COMPREHEN METABOLIC PANEL: CPT | Performed by: EMERGENCY MEDICINE

## 2024-01-20 PROCEDURE — 85025 COMPLETE CBC W/AUTO DIFF WBC: CPT | Performed by: EMERGENCY MEDICINE

## 2024-01-20 PROCEDURE — 63600175 PHARM REV CODE 636 W HCPCS: Performed by: EMERGENCY MEDICINE

## 2024-01-20 PROCEDURE — 83690 ASSAY OF LIPASE: CPT | Performed by: EMERGENCY MEDICINE

## 2024-01-20 PROCEDURE — 25500020 PHARM REV CODE 255: Performed by: EMERGENCY MEDICINE

## 2024-01-20 PROCEDURE — 96375 TX/PRO/DX INJ NEW DRUG ADDON: CPT

## 2024-01-20 PROCEDURE — 25000003 PHARM REV CODE 250: Performed by: EMERGENCY MEDICINE

## 2024-01-20 PROCEDURE — 96361 HYDRATE IV INFUSION ADD-ON: CPT

## 2024-01-20 PROCEDURE — 99284 EMERGENCY DEPT VISIT MOD MDM: CPT | Mod: ,,, | Performed by: EMERGENCY MEDICINE

## 2024-01-20 PROCEDURE — 99285 EMERGENCY DEPT VISIT HI MDM: CPT | Mod: 25

## 2024-01-20 PROCEDURE — 86140 C-REACTIVE PROTEIN: CPT | Performed by: EMERGENCY MEDICINE

## 2024-01-20 PROCEDURE — 96365 THER/PROPH/DIAG IV INF INIT: CPT | Mod: 59

## 2024-01-20 RX ORDER — METRONIDAZOLE 500 MG/1
500 TABLET ORAL 3 TIMES DAILY
Qty: 21 TABLET | Refills: 0 | Status: SHIPPED | OUTPATIENT
Start: 2024-01-20 | End: 2024-01-27

## 2024-01-20 RX ORDER — HYDROMORPHONE HYDROCHLORIDE 2 MG/ML
1 INJECTION, SOLUTION INTRAMUSCULAR; INTRAVENOUS; SUBCUTANEOUS
Status: COMPLETED | OUTPATIENT
Start: 2024-01-20 | End: 2024-01-20

## 2024-01-20 RX ORDER — DEXAMETHASONE SODIUM PHOSPHATE 4 MG/ML
8 INJECTION, SOLUTION INTRA-ARTICULAR; INTRALESIONAL; INTRAMUSCULAR; INTRAVENOUS; SOFT TISSUE
Status: COMPLETED | OUTPATIENT
Start: 2024-01-20 | End: 2024-01-20

## 2024-01-20 RX ORDER — ONDANSETRON HYDROCHLORIDE 2 MG/ML
4 INJECTION, SOLUTION INTRAVENOUS
Status: COMPLETED | OUTPATIENT
Start: 2024-01-20 | End: 2024-01-20

## 2024-01-20 RX ORDER — PREDNISONE 20 MG/1
TABLET ORAL
Qty: 12 TABLET | Refills: 0 | Status: SHIPPED | OUTPATIENT
Start: 2024-01-20 | End: 2024-02-14

## 2024-01-20 RX ORDER — METRONIDAZOLE 500 MG/100ML
500 INJECTION, SOLUTION INTRAVENOUS
Status: DISCONTINUED | OUTPATIENT
Start: 2024-01-20 | End: 2024-01-20 | Stop reason: HOSPADM

## 2024-01-20 RX ORDER — LANOLIN ALCOHOL/MO/W.PET/CERES
1 CREAM (GRAM) TOPICAL
COMMUNITY

## 2024-01-20 RX ADMIN — HYDROMORPHONE HYDROCHLORIDE 0.5 MG: 2 INJECTION INTRAMUSCULAR; INTRAVENOUS; SUBCUTANEOUS at 01:01

## 2024-01-20 RX ADMIN — SODIUM CHLORIDE 1000 ML: 9 INJECTION, SOLUTION INTRAVENOUS at 12:01

## 2024-01-20 RX ADMIN — DEXAMETHASONE SODIUM PHOSPHATE 8 MG: 4 INJECTION, SOLUTION INTRA-ARTICULAR; INTRALESIONAL; INTRAMUSCULAR; INTRAVENOUS; SOFT TISSUE at 02:01

## 2024-01-20 RX ADMIN — ONDANSETRON 4 MG: 2 INJECTION INTRAMUSCULAR; INTRAVENOUS at 01:01

## 2024-01-20 RX ADMIN — METRONIDAZOLE 500 MG: 5 INJECTION, SOLUTION INTRAVENOUS at 02:01

## 2024-01-20 RX ADMIN — IOPAMIDOL 100 ML: 755 INJECTION, SOLUTION INTRAVENOUS at 02:01

## 2024-01-20 NOTE — DISCHARGE INSTRUCTIONS
TAKE PREDNISONE AND FLAGYL AS DIRECTED.  DRINK PLENTY OF FLUIDS.  FOLLOW UP WITH GI.  RETURN TO THE EMERGENCY DEPARTMENT AS NEEDED.

## 2024-01-20 NOTE — ED PROVIDER NOTES
Encounter Date: 1/20/2024    SCRIBE #1 NOTE: I, Sherry Klaus, am scribing for, and in the presence of,  Randolph Peterson MD.       History     Chief Complaint   Patient presents with    Abdominal Pain     Abd pain for several days. Reported hx of ulcerative colitis. Vomiting reported tonight while trying otc meds.      Patient is a 32 y/o Male that presents to the ED for Abdominal Pain. Patient states he has a Mhx of UC and and possibly chron's. The patient state his UC has been flairing up. Patients spouse states he had a flair up right before this resent flair up. It lasted for 1 to 2 days and he took steroids for it at the time. However, this recent flair up has lasted for a week and a half, and he has taken only over the counter medications. There are no other issues to report with this patient at this time.    The history is provided by the patient and the spouse. No  was used.     Review of patient's allergies indicates:  No Known Allergies  Past Medical History:   Diagnosis Date    Ulcerative colitis      Past Surgical History:   Procedure Laterality Date    COLONOSCOPY  01/2023    ESOPHAGOGASTRODUODENOSCOPY       Family History   Problem Relation Age of Onset    Cancer Maternal Grandmother     Cancer Maternal Grandfather      Social History     Tobacco Use    Smoking status: Never    Smokeless tobacco: Never   Substance Use Topics    Alcohol use: Not Currently    Drug use: Never     Review of Systems   Constitutional:  Negative for fever.   Respiratory:  Negative for shortness of breath.    Cardiovascular:  Negative for chest pain.   Gastrointestinal:  Positive for abdominal pain.   All other systems reviewed and are negative.      Physical Exam     Initial Vitals [01/20/24 0009]   BP Pulse Resp Temp SpO2   (!) 140/84 (!) 115 20 98.1 °F (36.7 °C) 97 %      MAP       --         Physical Exam    Nursing note and vitals reviewed.  Constitutional: He appears well-developed and well-nourished.    HENT:   Head: Normocephalic and atraumatic.   Eyes: EOM are normal. Pupils are equal, round, and reactive to light.   Neck: Neck supple.   Normal range of motion.  Cardiovascular:  Normal rate, regular rhythm and normal heart sounds.           Pulmonary/Chest: Breath sounds normal.   Abdominal: Bowel sounds are normal.   Medium diffuse Abdominal pain    Musculoskeletal:         General: Normal range of motion.      Cervical back: Normal range of motion and neck supple.     Neurological: He is alert and oriented to person, place, and time. He has normal strength. GCS score is 15. GCS eye subscore is 4. GCS verbal subscore is 5. GCS motor subscore is 6.   Skin: Skin is warm and dry.   Psychiatric: He has a normal mood and affect.         ED Course   Procedures  Labs Reviewed   COMPREHENSIVE METABOLIC PANEL - Abnormal; Notable for the following components:       Result Value    Globulin 4.2 (*)     All other components within normal limits   C-REACTIVE PROTEIN - Abnormal; Notable for the following components:    CRP 9.73 (*)     All other components within normal limits   CBC WITH DIFFERENTIAL - Abnormal; Notable for the following components:    WBC 11.59 (*)     RBC 4.13 (*)     Hemoglobin 13.1 (*)     Hematocrit 38.1 (*)     MCH 31.7 (*)     Neutrophils % 74.6 (*)     Lymphocytes % 13.7 (*)     Monocytes % 10.2 (*)     Eosinophils % 0.8 (*)     Neutrophils, Abs 8.64 (*)     Monocytes, Absolute 1.18 (*)     Immature Granulocytes, Absolute 0.05 (*)     All other components within normal limits   LIPASE - Normal   CBC W/ AUTO DIFFERENTIAL    Narrative:     The following orders were created for panel order CBC auto differential.  Procedure                               Abnormality         Status                     ---------                               -----------         ------                     CBC with Differential[[id number]]       Abnormal            Final result                 Please view results for these  tests on the individual orders.   EXTRA TUBES    Narrative:     The following orders were created for panel order EXTRA TUBES.  Procedure                               Abnormality         Status                     ---------                               -----------         ------                     Light Blue Top Hold[[id number]]                             In process                 Gold Top Hold[[id number]]                                   In process                   Please view results for these tests on the individual orders.   LIGHT BLUE TOP HOLD   GOLD TOP HOLD          Imaging Results              CT Abdomen Pelvis With IV Contrast NO Oral Contrast (Final result)  Result time 01/20/24 07:50:17      Final result by Iain Benítez DO (01/20/24 07:50:17)                   Impression:      There is a moderate length segment of small bowel within the left upper quadrant which demonstrates some wall thickening suspicious for enteritis. There are mildly prominent nonspecific left upper quadrant mesenteric lymph nodes and left periaortic lymph nodes which may be reactive. Other/detailed findings as above.    Preliminary report was issued by [facility].    The CT exam was performed using one or more of the following dose    reduction techniques- Automated exposure control, adjustment of the mA    and/or kV according to patient size, and/or use of iterative    reconstructed technique.    Point of Service: [facility]      Electronically signed by: Iain Benítez  Date:    01/20/2024  Time:    07:50               Narrative:    EXAMINATION:  CT ABDOMEN PELVIS WITH IV CONTRAST    CLINICAL HISTORY:  Abdominal abscess/infection suspected;    COMPARISON:  None    TECHNIQUE:  Multiple axial tomographic images of the abdomen and pelvis were obtained after administration of 100 cc Isovue 370 intravenous contrast.    FINDINGS:  Mild dependent changes of the lungs noted.    No worrisome focal hepatic  abnormality demonstrated on submitted images.  Visualized gallbladder grossly unremarkable.  Visualized pancreas appears unremarkable.  Spleen grossly unremarkable.    Bilateral adrenal glands grossly unremarkable.  Bilateral kidneys appear grossly unremarkable.  Urinary bladder incompletely distended.  Prostate and seminal vesicles grossly unremarkable.    No convincing evidence of gastrointestinal obstruction or acute appendicitis.  There is a moderate length segment of small bowel within the left upper quadrant which demonstrates some wall thickening suspicious for enteritis.  There are mildly prominent nonspecific left upper quadrant mesenteric lymph nodes and left periaortic lymph nodes which may be reactive.  Vasculature grossly unremarkable.  Visualized osseous and surrounding soft tissue structures demonstrate no acute abnormality.                                       RADIOLOGY REPORT (Final result)  Result time 01/23/24 09:39:43      Final result by Unknown User (01/23/24 09:39:43)                                         Medications   sodium chloride 0.9% bolus 1,000 mL 1,000 mL (0 mLs Intravenous Stopped 1/20/24 0148)   HYDROmorphone (PF) injection 1 mg (0.5 mg Intravenous Given 1/20/24 0116)   ondansetron injection 4 mg (4 mg Intravenous Given 1/20/24 0116)   dexAMETHasone injection 8 mg (8 mg Intravenous Given 1/20/24 0230)   iopamidoL (ISOVUE-370) injection 100 mL (100 mLs Intravenous Given 1/20/24 0211)     Medical Decision Making  ABDOMINAL PAIN,  HISTORY OF INFLAMMATORY BOWEL DISEASE    DDX:  CROHN'S VS UC VS OTHER    OUTPATIENT TREATMENT    Amount and/or Complexity of Data Reviewed  Labs: ordered. Decision-making details documented in ED Course.  Radiology: ordered. Decision-making details documented in ED Course.    Risk  Prescription drug management.              Attending Attestation:           Physician Attestation for Scribe:  Physician Attestation Statement for Scribe #1: I, Randolph Westbrook,  MD, reviewed documentation, as scribed by Sherry Enrique in my presence, and it is both accurate and complete.                                    Clinical Impression:  Final diagnoses:  [R10.9] Abdominal pain, unspecified abdominal location (Primary)  [K52.9] Inflammatory bowel disease          ED Disposition Condition    Discharge Stable          ED Prescriptions       Medication Sig Dispense Start Date End Date Auth. Provider    metroNIDAZOLE (FLAGYL) 500 MG tablet Take 1 tablet (500 mg total) by mouth 3 (three) times daily. for 7 days 21 tablet 1/20/2024 1/27/2024 Randolph Westbrook MD    predniSONE (DELTASONE) 20 MG tablet 3 PO DAILY X 2 DAYS, THEN 2 PO DAILY X 2 DAYS, THEN 1 PO DAILY X 2 DAYS, THEN STOP. 12 tablet 1/20/2024 -- Randolph Westbrook MD          Follow-up Information       Follow up With Specialties Details Why Contact Info    Real Gama, DO Internal Medicine  As needed [address] [facility] [address]  [phone]               Randolph Westbrook MD  01/25/24 9314

## 2024-01-22 DIAGNOSIS — D64.9 ANEMIA, UNSPECIFIED TYPE: Primary | ICD-10-CM

## 2024-01-22 NOTE — TELEPHONE ENCOUNTER
Will you please schedule this patient for EGD and small bowel capsule. I asked Jocelin last week. Not sure what happened. Thanks. For asap please

## 2024-01-23 ENCOUNTER — PATIENT MESSAGE (OUTPATIENT)
Dept: GASTROENTEROLOGY | Facility: CLINIC | Age: 34
End: 2024-01-23
Payer: OTHER GOVERNMENT

## 2024-01-25 ENCOUNTER — PATIENT MESSAGE (OUTPATIENT)
Dept: GASTROENTEROLOGY | Facility: CLINIC | Age: 34
End: 2024-01-25
Payer: OTHER GOVERNMENT

## 2024-01-29 ENCOUNTER — HOSPITAL ENCOUNTER (OUTPATIENT)
Dept: GASTROENTEROLOGY | Facility: HOSPITAL | Age: 34
Discharge: HOME | End: 2024-01-29
Attending: INTERNAL MEDICINE
Payer: OTHER GOVERNMENT

## 2024-01-29 DIAGNOSIS — K29.00 ACUTE SUPERFICIAL GASTRITIS WITHOUT HEMORRHAGE: ICD-10-CM

## 2024-01-29 DIAGNOSIS — D64.9 ANEMIA, UNSPECIFIED TYPE: ICD-10-CM

## 2024-01-29 DIAGNOSIS — D50.0 IRON DEFICIENCY ANEMIA DUE TO CHRONIC BLOOD LOSS: Primary | ICD-10-CM

## 2024-01-29 DIAGNOSIS — K52.9 ENTERITIS: ICD-10-CM

## 2024-01-29 DIAGNOSIS — K29.80 DUODENITIS: ICD-10-CM

## 2024-01-29 PROCEDURE — 91110 GI TRC IMG INTRAL ESOPH-ILE: CPT | Mod: TC | Performed by: INTERNAL MEDICINE

## 2024-01-29 PROCEDURE — 91110 GI TRC IMG INTRAL ESOPH-ILE: CPT | Mod: 26,,, | Performed by: INTERNAL MEDICINE

## 2024-01-29 PROCEDURE — 27201423 OPTIME MED/SURG SUP & DEVICES STERILE SUPPLY

## 2024-02-01 ENCOUNTER — ANESTHESIA EVENT (OUTPATIENT)
Dept: GASTROENTEROLOGY | Facility: HOSPITAL | Age: 34
End: 2024-02-01
Payer: OTHER GOVERNMENT

## 2024-02-01 ENCOUNTER — HOSPITAL ENCOUNTER (OUTPATIENT)
Dept: GASTROENTEROLOGY | Facility: HOSPITAL | Age: 34
Discharge: HOME | End: 2024-02-01
Attending: NURSE PRACTITIONER
Payer: OTHER GOVERNMENT

## 2024-02-01 ENCOUNTER — ANESTHESIA (OUTPATIENT)
Dept: GASTROENTEROLOGY | Facility: HOSPITAL | Age: 34
End: 2024-02-01
Payer: OTHER GOVERNMENT

## 2024-02-01 VITALS
TEMPERATURE: 98 F | DIASTOLIC BLOOD PRESSURE: 68 MMHG | OXYGEN SATURATION: 98 % | RESPIRATION RATE: 20 BRPM | HEART RATE: 60 BPM | SYSTOLIC BLOOD PRESSURE: 115 MMHG

## 2024-02-01 DIAGNOSIS — D50.9 IRON DEFICIENCY ANEMIA, UNSPECIFIED IRON DEFICIENCY ANEMIA TYPE: ICD-10-CM

## 2024-02-01 DIAGNOSIS — R93.5 ABNORMAL CT OF THE ABDOMEN: ICD-10-CM

## 2024-02-01 DIAGNOSIS — K21.00 GASTROESOPHAGEAL REFLUX DISEASE WITH ESOPHAGITIS WITHOUT HEMORRHAGE: Primary | ICD-10-CM

## 2024-02-01 PROCEDURE — 27000284 HC CANNULA NASAL: Performed by: NURSE ANESTHETIST, CERTIFIED REGISTERED

## 2024-02-01 PROCEDURE — 63600175 PHARM REV CODE 636 W HCPCS: Performed by: NURSE ANESTHETIST, CERTIFIED REGISTERED

## 2024-02-01 PROCEDURE — 37000008 HC ANESTHESIA 1ST 15 MINUTES

## 2024-02-01 PROCEDURE — 88305 TISSUE EXAM BY PATHOLOGIST: CPT | Mod: 26,,, | Performed by: PATHOLOGY

## 2024-02-01 PROCEDURE — 88312 SPECIAL STAINS GROUP 1: CPT | Mod: 26,,, | Performed by: PATHOLOGY

## 2024-02-01 PROCEDURE — 43239 EGD BIOPSY SINGLE/MULTIPLE: CPT | Mod: ,,, | Performed by: INTERNAL MEDICINE

## 2024-02-01 PROCEDURE — 25000003 PHARM REV CODE 250: Performed by: NURSE ANESTHETIST, CERTIFIED REGISTERED

## 2024-02-01 PROCEDURE — 00731 ANES UPR GI NDSC PX NOS: CPT

## 2024-02-01 PROCEDURE — 88305 TISSUE EXAM BY PATHOLOGIST: CPT | Mod: TC,SUR | Performed by: INTERNAL MEDICINE

## 2024-02-01 PROCEDURE — 27201423 OPTIME MED/SURG SUP & DEVICES STERILE SUPPLY

## 2024-02-01 PROCEDURE — 43239 EGD BIOPSY SINGLE/MULTIPLE: CPT | Performed by: INTERNAL MEDICINE

## 2024-02-01 PROCEDURE — 88342 IMHCHEM/IMCYTCHM 1ST ANTB: CPT | Mod: 26,,, | Performed by: PATHOLOGY

## 2024-02-01 PROCEDURE — D9220A PRA ANESTHESIA: Mod: ,,, | Performed by: NURSE ANESTHETIST, CERTIFIED REGISTERED

## 2024-02-01 RX ORDER — PROPOFOL 10 MG/ML
VIAL (ML) INTRAVENOUS
Status: DISCONTINUED | OUTPATIENT
Start: 2024-02-01 | End: 2024-02-01

## 2024-02-01 RX ORDER — SODIUM CHLORIDE 0.9 % (FLUSH) 0.9 %
10 SYRINGE (ML) INJECTION
Status: DISCONTINUED | OUTPATIENT
Start: 2024-02-01 | End: 2024-02-02 | Stop reason: HOSPADM

## 2024-02-01 RX ORDER — LIDOCAINE HYDROCHLORIDE 20 MG/ML
INJECTION, SOLUTION EPIDURAL; INFILTRATION; INTRACAUDAL; PERINEURAL
Status: DISCONTINUED | OUTPATIENT
Start: 2024-02-01 | End: 2024-02-01

## 2024-02-01 RX ORDER — PANTOPRAZOLE SODIUM 40 MG/1
40 TABLET, DELAYED RELEASE ORAL DAILY
Qty: 30 TABLET | Refills: 3 | Status: SHIPPED | OUTPATIENT
Start: 2024-02-01 | End: 2024-05-08 | Stop reason: SDUPTHER

## 2024-02-01 RX ADMIN — SODIUM CHLORIDE: 9 INJECTION, SOLUTION INTRAVENOUS at 08:02

## 2024-02-01 RX ADMIN — LIDOCAINE HYDROCHLORIDE 50 MG: 20 INJECTION, SOLUTION INTRAVENOUS at 08:02

## 2024-02-01 RX ADMIN — LIDOCAINE HYDROCHLORIDE 100 MG: 20 INJECTION, SOLUTION INTRAVENOUS at 08:02

## 2024-02-01 RX ADMIN — PROPOFOL 100 MG: 10 INJECTION, EMULSION INTRAVENOUS at 08:02

## 2024-02-01 NOTE — ANESTHESIA PREPROCEDURE EVALUATION
02/01/2024  Evaristo Ty is a 33 y.o., male.      Pre-op Assessment    I have reviewed the Patient Summary Reports.     I have reviewed the Nursing Notes. I have reviewed the NPO Status.   I have reviewed the Medications.     Review of Systems  Anesthesia Hx:  No problems with previous Anesthesia                    Physical Exam  General: Well nourished, Cooperative, Alert and Oriented    Airway:  Mallampati: II   Mouth Opening: Normal  TM Distance: Normal  Tongue: Normal  Neck ROM: Normal ROM    Dental:  Intact        Anesthesia Plan  Type of Anesthesia, risks & benefits discussed:    Anesthesia Type: Gen Natural Airway  Intra-op Monitoring Plan: Standard ASA Monitors  Post Op Pain Control Plan: multimodal analgesia  Induction:  IV  Informed Consent: Informed consent signed with the Patient and all parties understand the risks and agree with anesthesia plan.  All questions answered. Patient consented to blood products? Yes  ASA Score: 2  Day of Surgery Review of History & Physical: I have interviewed and examined the patient. I have reviewed the patient's H&P dated:     Ready For Surgery From Anesthesia Perspective.     Past Medical History:   Diagnosis Date    Ulcerative colitis        Past Surgical History:   Procedure Laterality Date    COLONOSCOPY  01/2023    ESOPHAGOGASTRODUODENOSCOPY         Family History   Problem Relation Age of Onset    Cancer Maternal Grandmother     Cancer Maternal Grandfather        Social History     Socioeconomic History    Marital status:    Tobacco Use    Smoking status: Never    Smokeless tobacco: Never   Substance and Sexual Activity    Alcohol use: Not Currently    Drug use: Never       Current Outpatient Medications   Medication Sig Dispense Refill    ALLEGRA ALLERGY 180 mg tablet Take 180 mg by mouth once daily.      ferrous sulfate (FEOSOL) Tab tablet  Take 1 tablet by mouth daily with breakfast.      mesalamine (LIALDA) 1.2 gram TbEC Take 2.4 g by mouth once daily.      predniSONE (DELTASONE) 20 MG tablet 3 PO DAILY X 2 DAYS, THEN 2 PO DAILY X 2 DAYS, THEN 1 PO DAILY X 2 DAYS, THEN STOP. 12 tablet 0     No current facility-administered medications for this encounter.       Review of patient's allergies indicates:  No Known Allergies .p  .

## 2024-02-01 NOTE — TRANSFER OF CARE
Anesthesia Transfer of Care Note    Patient: Evaristo Ty    Procedure(s) Performed: * EGD*    Patient location: GI    Anesthesia Type: general    Transport from OR: Transported from OR on room air with adequate spontaneous ventilation    Post pain: adequate analgesia    Post assessment: no apparent anesthetic complications    Post vital signs: stable    Level of consciousness: responds to stimulation    Nausea/Vomiting: no nausea/vomiting    Complications: none    Transfer of care protocol was followed      Last vitals: Visit Vitals  /66   Pulse 88   Temp 36.8 °C (98.2 °F) (Oral)   Resp 16   SpO2 99%

## 2024-02-01 NOTE — DISCHARGE INSTRUCTIONS
Procedure Date  2/1/24     Impression  Overall Impression:   Performed forceps biopsies in the esophagus, stomach and duodenum  Esophagitis in the lower third of the esophagus; performed cold forceps biopsy  The distal esophagus had an erosion, consistent with reflux esophagitis. The distal esophagus was biopsied. The stomach and duodenal had mild erythema and biopsies were obtained.  Recommendation    Await pathology results      Discharge: disp: DC to home. Resume diet. No driving x 24 hours. Follow-up with PCP as scheduled. Avoid nsaids; take PPI daily for at least 8 weeks. Take otc iron 325mg daily. Return to GI clinic in 1 month.  Dx: reflux esophagitis, iron deficiency anemia, abnormal CT abdomen suggests Crohn's disease.      Please call the GI Lab if you have any nausea, vomiting, or abdominal pain.  NO DRIVING, OPERATING EQUIPMENT, OR SIGNING LEGAL DOCUMENTS FOR 24 HOURS.  THE NURSE WILL CALL YOU WITH YOUR BIOPSY RESULTS IN A FEW DAYS.

## 2024-02-01 NOTE — H&P
[facility] - Endoscopy  Gastroenterology  H&P    Patient Name: Evaristo Ty  MRN: [id number]  Admission Date: 2/1/2024  Code Status: Prior    Attending Provider: Charla Madrid FNP   Primary Care Physician: Real Gama DO  Principal Problem:<principal problem not specified>    Subjective:     History of Present Illness: Pt has iron deficiency anemia and small bowel inflammation on CT, suggesting Crohn's disease.    Past Medical History:   Diagnosis Date    Ulcerative colitis        Past Surgical History:   Procedure Laterality Date    COLONOSCOPY  01/2023    ESOPHAGOGASTRODUODENOSCOPY         Review of patient's allergies indicates:  No Known Allergies  Family History       Problem Relation (Age of Onset)    Cancer Maternal Grandmother, Maternal Grandfather          Tobacco Use    Smoking status: Never    Smokeless tobacco: Never   Substance and Sexual Activity    Alcohol use: Not Currently    Drug use: Never    Sexual activity: Not on file     Review of Systems   Respiratory: Negative.     Cardiovascular: Negative.    Gastrointestinal: Negative.      Objective:     Vital Signs (Most Recent):  Pulse: 63 (02/01/24 0734)  Resp: (!) 132 (02/01/24 0734)  BP: 114/69 (02/01/24 0734)  SpO2: 98 % (02/01/24 0734) Vital Signs (24h Range):  Pulse:  [63] 63  Resp:  [132] 132  SpO2:  [98 %] 98 %  BP: (114)/(69) 114/69        There is no height or weight on file to calculate BMI.    No intake or output data in the 24 hours ending 02/01/24 0837    Lines/Drains/Airways       Peripheral Intravenous Line  Duration                  Peripheral IV - Single Lumen 02/01/24 0733 22 G Anterior;Distal;Right Upper Arm <1 day                    Physical Exam  Vitals reviewed.   Constitutional:       General: He is not in acute distress.     Appearance: Normal appearance. He is well-developed. He is not ill-appearing.   HENT:      Head: Normocephalic and atraumatic.      Nose: Nose normal.   Eyes:      Pupils: Pupils are equal,  "round, and reactive to light.   Cardiovascular:      Rate and Rhythm: Normal rate and regular rhythm.   Pulmonary:      Effort: Pulmonary effort is normal.      Breath sounds: Normal breath sounds. No wheezing.   Abdominal:      General: Abdomen is flat. Bowel sounds are normal. There is no distension.      Palpations: Abdomen is soft.      Tenderness: There is no abdominal tenderness. There is no guarding.   Skin:     General: Skin is warm and dry.      Coloration: Skin is not jaundiced.   Neurological:      Mental Status: He is alert.   Psychiatric:         Attention and Perception: Attention normal.         Mood and Affect: Affect normal.         Speech: Speech normal.         Behavior: Behavior is cooperative.      Comments: Pt was calm while speaking.         Significant Labs:  CBC: No results for input(s): "WBC", "HGB", "HCT", "PLT" in the last 48 hours.  CMP: No results for input(s): "GLU", "CALCIUM", "ALBUMIN", "PROT", "NA", "K", "CO2", "CL", "BUN", "CREATININE", "ALKPHOS", "ALT", "AST", "BILITOT" in the last 48 hours.    Significant Imaging:  Imaging results within the past 24 hours have been reviewed.    Assessment/Plan:     There are no hospital problems to display for this patient.        Imp: iron deficiency anemia, elevated CRP.  Plan: egd    Nathen Morris MD  Gastroenterology  [facility] - Endoscopy  "

## 2024-02-02 LAB
DHEA SERPL-MCNC: NORMAL
ESTROGEN SERPL-MCNC: NORMAL PG/ML
INSULIN SERPL-ACNC: NORMAL U[IU]/ML
LAB AP GROSS DESCRIPTION: NORMAL
LAB AP LABORATORY NOTES: NORMAL
T3RU NFR SERPL: NORMAL %

## 2024-02-02 NOTE — PROGRESS NOTES
EGD biopsies show reflux esophagitis, gastritis without H.pylori and focal duodenitis.  Recommend: avoid nsaids; continue protonix for at least 8 weeks.

## 2024-02-04 PROBLEM — K29.80 DUODENITIS: Status: ACTIVE | Noted: 2024-02-04

## 2024-02-04 PROBLEM — K52.9 ENTERITIS: Status: ACTIVE | Noted: 2024-02-04

## 2024-02-04 PROBLEM — K29.00 ACUTE SUPERFICIAL GASTRITIS WITHOUT HEMORRHAGE: Status: ACTIVE | Noted: 2024-02-04

## 2024-02-05 ENCOUNTER — PATIENT MESSAGE (OUTPATIENT)
Dept: GASTROENTEROLOGY | Facility: CLINIC | Age: 34
End: 2024-02-05
Payer: OTHER GOVERNMENT

## 2024-02-05 DIAGNOSIS — D50.9 IRON DEFICIENCY ANEMIA, UNSPECIFIED IRON DEFICIENCY ANEMIA TYPE: Primary | ICD-10-CM

## 2024-02-05 DIAGNOSIS — K52.9 IBD (INFLAMMATORY BOWEL DISEASE): ICD-10-CM

## 2024-02-05 DIAGNOSIS — K51.919 ULCERATIVE COLITIS WITH COMPLICATION, UNSPECIFIED LOCATION: ICD-10-CM

## 2024-02-14 ENCOUNTER — OFFICE VISIT (OUTPATIENT)
Dept: GASTROENTEROLOGY | Facility: CLINIC | Age: 34
End: 2024-02-14
Payer: OTHER GOVERNMENT

## 2024-02-14 VITALS
DIASTOLIC BLOOD PRESSURE: 75 MMHG | BODY MASS INDEX: 27.88 KG/M2 | HEART RATE: 67 BPM | HEIGHT: 73 IN | WEIGHT: 210.38 LBS | SYSTOLIC BLOOD PRESSURE: 139 MMHG

## 2024-02-14 DIAGNOSIS — K52.9 INFLAMMATORY BOWEL DISEASE: Primary | ICD-10-CM

## 2024-02-14 DIAGNOSIS — K52.9 ENTERITIS: ICD-10-CM

## 2024-02-14 PROCEDURE — 99213 OFFICE O/P EST LOW 20 MIN: CPT | Mod: PBBFAC | Performed by: NURSE PRACTITIONER

## 2024-02-14 PROCEDURE — 99214 OFFICE O/P EST MOD 30 MIN: CPT | Mod: S$PBB,,, | Performed by: NURSE PRACTITIONER

## 2024-02-14 RX ORDER — BUDESONIDE 3 MG/1
6 CAPSULE, COATED PELLETS ORAL DAILY
Qty: 60 CAPSULE | Refills: 3 | Status: SHIPPED | OUTPATIENT
Start: 2024-02-14 | End: 2024-06-13

## 2024-02-14 NOTE — PROGRESS NOTES
Evaristo Ty is a 33 y.o. male here for Follow-up        PCP: Real Gama  Referring Provider: No referring provider defined for this encounter.     HPI:  Presents for follow up after EGD, SBC and CT. EGD on 2/1/24, The distal esophagus had an erosion, consistent with reflux esophagitis. The distal esophagus was biopsied. The stomach and duodenal had mild erythema and biopsies were obtained.Focally acute duodenitis with villous blunting. Reflux esophagitis. SBC on 1/18/24, A section of inflammation and mucosal erosion was noted at 25minutes during the exam. This is suggestive of Crohn's Disease, but not diagnostic. Colonoscopy on 12/28/23, biopsy throughout the colon were negative for inflammation and granuloma. MR Enterography on 12/29/23, There is some under distension of the rectum with some mucosal enhancement suspicious for proctitis.  Consider colonoscopy. No other convincing abnormal bowel wall thickening or enhancement . MR Enterography followed colonoscopy and without indication of inflammation in the rectum or proctitis. He presented to the ER on 1/20/24 due to severe central abdominal pain associated with nausea and vomiting. Flares of abdominal pain are associated with an increase in loose stools and diarrhea. CT abdomen and pelvis performed at the ER, report reviewed, There is a moderate length segment of small bowel within the left upper quadrant which demonstrates some wall thickening suspicious for enteritis. There are mildly prominent nonspecific left upper quadrant mesenteric lymph nodes and left periaortic lymph nodes which may be reactive. The patient's clinical picture in association with small bowel thickening and inflammation, elevated CRP, and REINIER is suspicious for Crohn's but not diagnostic. Dr. Morris did see the patient in conjunction with me today. We are going to recommend a referral to a teaching facility or [facility] facility that can perform a Deep Small bowel  enteroscopy or push enteroscopy for possible tissue diagnosis. Labs reviewed. Iron 35 and saturation 112 %. Hgb 13.1 and HCT 38.1, CRP 9.73. IBD panel is Negative for S. cerevisiae antibodies and pANCA. Approximately 50% of individuals with IBD are negative for IgA and IgG S. cerevisiae and pANCA. Saccharomyces cerevisiae Ab,  7.8            Follow-up  Associated symptoms include abdominal pain. Pertinent negatives include no change in bowel habit, chest pain, fatigue, fever, myalgias, nausea or vomiting.         ROS:  Review of Systems   Constitutional:  Negative for activity change, fatigue and fever.   HENT:  Negative for dental problem and trouble swallowing.    Cardiovascular:  Negative for chest pain.   Gastrointestinal:  Positive for abdominal pain. Negative for abdominal distention, blood in stool, change in bowel habit, constipation, diarrhea, nausea, vomiting and reflux.   Musculoskeletal:  Negative for gait problem and myalgias.   Integumentary:  Negative for color change.   Hematological:  Does not bruise/bleed easily.   Psychiatric/Behavioral:  Negative for sleep disturbance. The patient is not nervous/anxious.           PMHX:  has a past medical history of Ulcerative colitis.    PSHX:  has a past surgical history that includes Colonoscopy (01/2023) and Esophagogastroduodenoscopy.    PFHX: family history includes Cancer in his maternal grandfather and maternal grandmother.    PSlHX:  reports that he has never smoked. He has never used smokeless tobacco. He reports that he does not currently use alcohol. He reports that he does not use drugs.        Review of patient's allergies indicates:  No Known Allergies    Medication List with Changes/Refills   New Medications    BUDESONIDE (ENTOCORT EC) 3 MG CAPSULE    Take 2 capsules (6 mg total) by mouth once daily.   Current Medications    ALLEGRA ALLERGY 180 MG TABLET    Take 180 mg by mouth once daily.    FERROUS SULFATE (FEOSOL) TAB TABLET    Take 1 tablet  "by mouth daily with breakfast.    MESALAMINE (LIALDA) 1.2 GRAM TBEC    Take 2.4 g by mouth once daily.    PANTOPRAZOLE (PROTONIX) 40 MG TABLET    Take 1 tablet (40 mg total) by mouth once daily.   Discontinued Medications    PREDNISONE (DELTASONE) 20 MG TABLET    3 PO DAILY X 2 DAYS, THEN 2 PO DAILY X 2 DAYS, THEN 1 PO DAILY X 2 DAYS, THEN STOP.        Objective Findings:  Vital Signs:  /75   Pulse 67   Ht 6' 1" (1.854 m)   Wt 95.4 kg (210 lb 6.4 oz)   BMI 27.76 kg/m²  Body mass index is 27.76 kg/m².    Physical Exam:  Physical Exam  Vitals and nursing note reviewed.   Constitutional:       General: He is not in acute distress.     Appearance: Normal appearance.   HENT:      Mouth/Throat:      Mouth: Mucous membranes are moist.   Cardiovascular:      Rate and Rhythm: Normal rate.   Pulmonary:      Effort: Pulmonary effort is normal.      Breath sounds: No wheezing, rhonchi or rales.   Abdominal:      General: Bowel sounds are normal. There is no distension.      Palpations: Abdomen is soft. There is no mass.      Tenderness: There is no abdominal tenderness. There is no guarding.      Hernia: No hernia is present.   Skin:     General: Skin is warm and dry.      Coloration: Skin is not jaundiced or pale.   Neurological:      Mental Status: He is alert and oriented to person, place, and time.   Psychiatric:         Mood and Affect: Mood normal.          Labs:  Lab Results   Component Value Date    WBC 11.59 (H) 01/20/2024    HGB 13.1 (L) 01/20/2024    HCT 38.1 (L) 01/20/2024    MCV 92.3 01/20/2024    RDW 12.3 01/20/2024     01/20/2024    LYMPH 13.7 (L) 01/20/2024    LYMPH 1.59 01/20/2024    MONO 10.2 (H) 01/20/2024    EOS 0.09 01/20/2024    BASO 0.04 01/20/2024     Lab Results   Component Value Date     01/20/2024    K 3.6 01/20/2024     01/20/2024    CO2 27 01/20/2024    GLU 98 01/20/2024    BUN 17 01/20/2024    CREATININE 0.91 01/20/2024    CALCIUM 9.5 01/20/2024    PROT 7.7 01/20/2024 "    ALBUMIN 3.5 01/20/2024    BILITOT 0.3 01/20/2024    ALKPHOS 100 01/20/2024    AST 31 01/20/2024    ALT 28 01/20/2024         Imaging: CAPSULE ENTEROSCOPY    Result Date: 2/4/2024  Table formatting from the original result was not included. Procedure Date 1/29/24 Impression Overall      Gastritis and duodenitis were noted. A section of inflammation and mucosal erosion was noted at 25minutes during the exam. This is suggestive of Crohn's Disease, but not diagnostic. Recommendation Follow-up in GI clinic, check IBD  panel, resume mesalamine Indication Anemia, unspecified type; abnormal CT abdomen, elevated inflammatory markers in serum and stool. Providers Gaby Puri, RN Registered Nurse Medications Sedation Totals unavailable because the procedure time range is not set Preprocedure A history and physical has been performed, and patient medication allergies have been reviewed. The patient's tolerance of previous anesthesia has been reviewed. The risks and benefits of the procedure and the sedation options and risks were discussed with the patient. All questions were answered and informed consent obtained. ASA Score: ASA 2 - Patient with mild systemic disease with no functional limitations Mallampati Airway Score: II (hard and soft palate, upper portion of tonsils anduvula visible) Details of the Procedure No information documented See scanned report. Scope:  Scope Serial:  Events Procedure Events Event Event Time Procedure Events Event Event Time Findings Gastritis, duodenitis, enteritis.     EGD    Result Date: 2/1/2024  Table formatting from the original result was not included. Procedure Date 2/1/24 Impression Overall      Performed forceps biopsies in the esophagus, stomach and duodenum Esophagitis in the lower third of the esophagus; performed cold forceps biopsy The distal esophagus had an erosion, consistent with reflux esophagitis. The distal esophagus was biopsied. The stomach and duodenal had  mild erythema and biopsies were obtained. Recommendation  Await pathology results Discharge: disp: DC to home. Resume diet. No driving x 24 hours. Follow-up with PCP as scheduled. Avoid nsaids; take PPI daily for at least 8 weeks. Take otc iron 325mg daily. Return to GI clinic in 1 month. Dx: reflux esophagitis, iron deficiency anemia, abnormal CT abdomen suggests Crohn's disease. Indication Iron deficiency anemia, unspecified iron deficiency anemia type Providers Rubens Claros, Arlet Persaud CRNA Technician Nathen Morris MD Proceduralist Raven Burroughs, RN Registered Nurse Medications Moderate sedation administered by anesthesia staff - See anesthesia record. Preprocedure A history and physical has been performed, and patient medication allergies have been reviewed. The patient's tolerance of previous anesthesia has been reviewed. The risks and benefits of the procedure and the sedation options and risks were discussed with the patient. All questions were answered and informed consent obtained. ASA Score: ASA 2 - Patient with mild systemic disease with no functional limitations Mallampati Airway Score: II (hard and soft palate, upper portion of tonsils anduvula visible) Details of the Procedure The patient underwent monitored anesthesia care, which was administered by an anesthesia professional. The patient's blood pressure, heart rate, level of consciousness, oxygen, respirations, ECG and ETCO2 were monitored throughout the procedure. The scope was introduced through the mouth and advanced to the third part of the duodenum. Retroflexion was performed in the cardia. Prior to the procedure, the patient's H. Pylori status was unknown. The patient's estimated blood loss was minimal (<5 mL). The procedure was not difficult. The patient tolerated the procedure well. There were no apparent adverse events. Scope: Gastroscope Scope Serial: [id number] Events Procedure Events Event Event Time Procedure Events  Event Event Time SCOPE IN 2/1/2024  8:46 AM SCOPE OUT 2/1/2024  8:49 AM SCOPE IN 2/1/2024  8:51 AM SCOPE OUT 2/1/2024  8:56 AM Findings Performed forceps biopsies in the esophagus, stomach and duodenum Esophagitis, showing erythematous mucosa with erosion in the lower third of the esophagus; performed cold forceps biopsy     CT Abdomen Pelvis With IV Contrast NO Oral Contrast    Result Date: 1/20/2024  EXAMINATION: CT ABDOMEN PELVIS WITH IV CONTRAST CLINICAL HISTORY: Abdominal abscess/infection suspected; COMPARISON: None TECHNIQUE: Multiple axial tomographic images of the abdomen and pelvis were obtained after administration of 100 cc Isovue 370 intravenous contrast. FINDINGS: Mild dependent changes of the lungs noted. No worrisome focal hepatic abnormality demonstrated on submitted images.  Visualized gallbladder grossly unremarkable.  Visualized pancreas appears unremarkable.  Spleen grossly unremarkable. Bilateral adrenal glands grossly unremarkable.  Bilateral kidneys appear grossly unremarkable.  Urinary bladder incompletely distended.  Prostate and seminal vesicles grossly unremarkable. No convincing evidence of gastrointestinal obstruction or acute appendicitis.  There is a moderate length segment of small bowel within the left upper quadrant which demonstrates some wall thickening suspicious for enteritis.  There are mildly prominent nonspecific left upper quadrant mesenteric lymph nodes and left periaortic lymph nodes which may be reactive.  Vasculature grossly unremarkable.  Visualized osseous and surrounding soft tissue structures demonstrate no acute abnormality.     There is a moderate length segment of small bowel within the left upper quadrant which demonstrates some wall thickening suspicious for enteritis. There are mildly prominent nonspecific left upper quadrant mesenteric lymph nodes and left periaortic lymph nodes which may be reactive. Other/detailed findings as above. Preliminary report was  issued by [facility]. The CT exam was performed using one or more of the following dose reduction techniques- Automated exposure control, adjustment of the mA and/or kV according to patient size, and/or use of iterative reconstructed technique. Point of Service: [facility] Electronically signed by: Iain Benítez Date:    01/20/2024 Time:    07:50        Assessment:  Evaristo Ty is a 33 y.o. male here with:  1. Inflammatory bowel disease    2. Enteritis          Recommendations:  1. Iron studies, HGB, and CRP  2. Budesonide 6 mg daily, continue mesalamine  3. Avoid NSAID's  4. Recommend referral to an academic center or [facility] facility that is able to perform deep small bowel enteroscopy (push enteroscopy). We do not have equipment availability at this facility  5. Follow up in 3 weeks    No follow-ups on file.      Order summary:  Orders Placed This Encounter    Iron and TIBC    Ferritin    Hemoglobin and Hematocrit    C-Reactive Protein    budesonide (ENTOCORT EC) 3 mg capsule       Thank you for allowing me to participate in the care of Evaristo Ty.      DANAE Mckenzie

## 2024-02-20 ENCOUNTER — PATIENT MESSAGE (OUTPATIENT)
Dept: GASTROENTEROLOGY | Facility: CLINIC | Age: 34
End: 2024-02-20
Payer: OTHER GOVERNMENT

## 2024-02-20 DIAGNOSIS — K52.9 ENTERITIS: ICD-10-CM

## 2024-02-20 DIAGNOSIS — K52.9 INFLAMMATORY BOWEL DISEASE: Primary | ICD-10-CM

## 2024-02-20 DIAGNOSIS — D50.9 IRON DEFICIENCY ANEMIA, UNSPECIFIED IRON DEFICIENCY ANEMIA TYPE: ICD-10-CM

## 2024-03-04 ENCOUNTER — PATIENT MESSAGE (OUTPATIENT)
Dept: GASTROENTEROLOGY | Facility: CLINIC | Age: 34
End: 2024-03-04
Payer: OTHER GOVERNMENT

## 2024-03-07 ENCOUNTER — TELEPHONE (OUTPATIENT)
Dept: GASTROENTEROLOGY | Facility: CLINIC | Age: 34
End: 2024-03-07
Payer: OTHER GOVERNMENT

## 2024-03-07 ENCOUNTER — PATIENT MESSAGE (OUTPATIENT)
Dept: GASTROENTEROLOGY | Facility: CLINIC | Age: 34
End: 2024-03-07
Payer: OTHER GOVERNMENT

## 2024-03-07 DIAGNOSIS — K52.9 ENTERITIS: Primary | ICD-10-CM

## 2024-03-07 NOTE — TELEPHONE ENCOUNTER
Referral from Dr. Rodgers for evaluation of small bowel inflammation, suspected Crohn's disease.  VCE and CT indicating small bowel inflammation, in the proximal small bowel.      Will plan for antegrade double-balloon enteroscopy to assess further. Case request placed and will let the referring know.     Will have clinic staff let the patient know as well.

## 2024-03-07 NOTE — TELEPHONE ENCOUNTER
----- Message from Diana Trevino MA sent at 3/6/2024  4:10 PM CST -----  Dr Duncan,   Can you please review?  Thanks  ----- Message -----  From: Gudelia Franklin RN  Sent: 3/6/2024   3:35 PM CST  To: Diana Trevino MA; Seble Jasmine RN    Usually our physicians send a staff message to him directly regarding the need for the procedure and he places the order himself. I am looping in Ann who works directly with him to see if anything else needs to be done.  ----- Message -----  From: Seble Jasmine RN  Sent: 3/6/2024   3:32 PM CST  To: Gudelia Franklin RN    Ok thank you. So do we just need to put in a referral directly to him?          ----- Message -----  From: Gudelia Franklin RN  Sent: 3/6/2024   3:13 PM CST  To: KAREN Murillo,    The referral we received was from the NP to our clinic for Ulcerative Colitis so based on this information it was assumed that the patient was being referred to us as an IBD specialist not for a scope. The reason we mentioned Dr. Mcnair is due to her prior IBD practice when she was at [facility]; however, with the clarification provided in your most recent message we see this is not needed.    Dr. Morris should reach out to Dr. Aime Duncan regarding the double balloon enteroscopy since he is the physician who does this at [facility].    Gudelia  ----- Message -----  From: Seble Jasmine RN  Sent: 3/6/2024   3:03 PM CST  To: KAREN Blackmon Dr. is the patient's doctor who performed the colonoscopy, who is wanting the patient have double balloon enteroscopy. Why would he need to see another doctor from our office that does the same thing? Just wanted to clarify that it was the physician who ordered this referral, and not the NP. Thank you.        ----- Message -----  From: Gudelia Franklin RN  Sent: 3/6/2024   2:46 PM CST  To: KAREN Murillo Dr. reviewed this patient's chart and recommends the patient see Dr. Roger  Tabby in clinic to determine if the patient needs a double balloon enteroscopy. If so, Dr. Mcnair will need to reach out to Dr. Aime Duncan who performs this procedure.    Gudelia  ----- Message -----  From: Seble Jasmine RN  Sent: 3/5/2024   8:36 AM CST  To: Gudelia Franklin RN    Hello,  What we are needing is for the patient to have a double balloon enteroscopy to confirm if the patient has UC or Chron's because the patient has had a colonoscopy as well as a small bowel capsule that are normal. The patient does not have a definitive diagnosis yet. We do not have the capability to perform the double balloon enteroscopy at our facility. Dr. Mcnair is at our facility also. 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 Please let me know. Thank you.          ----- Message -----  From: Gudelia Franklin, RN  Sent: 3/4/2024   3:39 PM CST  To: Julius Sawyer    Good afternoon,    I received a referral for this patient to establish care with the IBD clinic. We have very limited new patient availability at this time. Dr. Kana Mcnair is located in [place], [place] and may be able to see this patient for Ulcerative Colitis sooner. Please let me know if you would like us to proceed with our new patient process.    KAREN Galeas  Clinical Care Coordinator-Inflammatory Bowel Disease   [facility]

## 2024-03-19 ENCOUNTER — TELEPHONE (OUTPATIENT)
Dept: GASTROENTEROLOGY | Facility: CLINIC | Age: 34
End: 2024-03-19
Payer: OTHER GOVERNMENT

## 2024-03-19 ENCOUNTER — TELEPHONE (OUTPATIENT)
Dept: ENDOSCOPY | Facility: HOSPITAL | Age: 34
End: 2024-03-19
Payer: OTHER GOVERNMENT

## 2024-03-20 ENCOUNTER — PATIENT MESSAGE (OUTPATIENT)
Dept: ENDOSCOPY | Facility: HOSPITAL | Age: 34
End: 2024-03-20
Payer: OTHER GOVERNMENT

## 2024-03-20 ENCOUNTER — TELEPHONE (OUTPATIENT)
Dept: ENDOSCOPY | Facility: HOSPITAL | Age: 34
End: 2024-03-20
Payer: OTHER GOVERNMENT

## 2024-03-20 NOTE — TELEPHONE ENCOUNTER
Spoke to patient to schedule procedure(s) Antegrade Double Balloon       Physician to perform procedure(s) Dr. ZAYNAB Duncan  Date of Procedure (s) 04/15/2024  Arrival Time 6:30 AM  Time of Procedure(s) 7:30 AM   Location of Procedure(s) [place] [address] Floor  Type of Rx Prep sent to patient: Other  Instructions provided to patient via [facility]    Patient was informed on the following information and verbalized understanding. Screening questionnaire reviewed with patient and complete. If procedure requires anesthesia, a responsible adult needs to be present to accompany the patient home, patient cannot drive after receiving anesthesia. Appointment details are tentative, especially check-in time. Patient will receive a prep-op call 7 days prior to confirm check-in time for procedure. If applicable the patient should contact their pharmacy to verify Rx for procedure prep is ready for pick-up. Patient was advised to call the scheduling department at [phone] if pharmacy states no Rx is available. Patient was advised to call the endoscopy scheduling department if any questions or concerns arise.      [facility] Scheduling Department

## 2024-04-09 ENCOUNTER — TELEPHONE (OUTPATIENT)
Dept: ENDOSCOPY | Facility: HOSPITAL | Age: 34
End: 2024-04-09
Payer: OTHER GOVERNMENT

## 2024-04-15 ENCOUNTER — HOSPITAL ENCOUNTER (OUTPATIENT)
Facility: HOSPITAL | Age: 34
Discharge: HOME | End: 2024-04-15
Attending: INTERNAL MEDICINE | Admitting: INTERNAL MEDICINE
Payer: OTHER GOVERNMENT

## 2024-04-15 ENCOUNTER — ANESTHESIA (OUTPATIENT)
Dept: ENDOSCOPY | Facility: HOSPITAL | Age: 34
End: 2024-04-15
Payer: OTHER GOVERNMENT

## 2024-04-15 ENCOUNTER — ANESTHESIA EVENT (OUTPATIENT)
Dept: ENDOSCOPY | Facility: HOSPITAL | Age: 34
End: 2024-04-15
Payer: OTHER GOVERNMENT

## 2024-04-15 VITALS
DIASTOLIC BLOOD PRESSURE: 67 MMHG | SYSTOLIC BLOOD PRESSURE: 115 MMHG | OXYGEN SATURATION: 97 % | RESPIRATION RATE: 19 BRPM | TEMPERATURE: 98 F | BODY MASS INDEX: 25.84 KG/M2 | WEIGHT: 195 LBS | HEIGHT: 73 IN | HEART RATE: 79 BPM

## 2024-04-15 DIAGNOSIS — R93.5 ABNORMAL CT OF THE ABDOMEN: Primary | ICD-10-CM

## 2024-04-15 DIAGNOSIS — R93.3 ABNORMAL CT SCAN, SMALL BOWEL: ICD-10-CM

## 2024-04-15 PROCEDURE — 25000003 PHARM REV CODE 250: Performed by: NURSE ANESTHETIST, CERTIFIED REGISTERED

## 2024-04-15 PROCEDURE — 37000009 HC ANESTHESIA EA ADD 15 MINS: Performed by: INTERNAL MEDICINE

## 2024-04-15 PROCEDURE — 37000008 HC ANESTHESIA 1ST 15 MINUTES: Performed by: INTERNAL MEDICINE

## 2024-04-15 PROCEDURE — 88305 TISSUE EXAM BY PATHOLOGIST: CPT | Mod: 26,,, | Performed by: PATHOLOGY

## 2024-04-15 PROCEDURE — 44361 SMALL BOWEL ENDOSCOPY/BIOPSY: CPT | Mod: ,,, | Performed by: INTERNAL MEDICINE

## 2024-04-15 PROCEDURE — 27201030 HC OVERTUBE: Performed by: INTERNAL MEDICINE

## 2024-04-15 PROCEDURE — D9220A PRA ANESTHESIA: Mod: ANES,,, | Performed by: INTERNAL MEDICINE

## 2024-04-15 PROCEDURE — 88342 IMHCHEM/IMCYTCHM 1ST ANTB: CPT | Mod: 26,,, | Performed by: PATHOLOGY

## 2024-04-15 PROCEDURE — 25000003 PHARM REV CODE 250: Performed by: INTERNAL MEDICINE

## 2024-04-15 PROCEDURE — 63600175 PHARM REV CODE 636 W HCPCS: Performed by: NURSE ANESTHETIST, CERTIFIED REGISTERED

## 2024-04-15 PROCEDURE — 88305 TISSUE EXAM BY PATHOLOGIST: CPT | Performed by: PATHOLOGY

## 2024-04-15 PROCEDURE — 44361 SMALL BOWEL ENDOSCOPY/BIOPSY: CPT | Performed by: INTERNAL MEDICINE

## 2024-04-15 PROCEDURE — 88342 IMHCHEM/IMCYTCHM 1ST ANTB: CPT | Performed by: PATHOLOGY

## 2024-04-15 PROCEDURE — 27201012 HC FORCEPS, HOT/COLD, DISP: Performed by: INTERNAL MEDICINE

## 2024-04-15 PROCEDURE — D9220A PRA ANESTHESIA: Mod: CRNA,,, | Performed by: NURSE ANESTHETIST, CERTIFIED REGISTERED

## 2024-04-15 RX ORDER — LIDOCAINE HYDROCHLORIDE 20 MG/ML
INJECTION INTRAVENOUS
Status: DISCONTINUED | OUTPATIENT
Start: 2024-04-15 | End: 2024-04-15

## 2024-04-15 RX ORDER — SODIUM CHLORIDE 9 MG/ML
INJECTION, SOLUTION INTRAVENOUS CONTINUOUS
Status: DISCONTINUED | OUTPATIENT
Start: 2024-04-15 | End: 2024-04-15 | Stop reason: HOSPADM

## 2024-04-15 RX ORDER — SUCCINYLCHOLINE CHLORIDE 20 MG/ML
INJECTION INTRAMUSCULAR; INTRAVENOUS
Status: DISCONTINUED | OUTPATIENT
Start: 2024-04-15 | End: 2024-04-15

## 2024-04-15 RX ORDER — HALOPERIDOL 5 MG/ML
0.5 INJECTION INTRAMUSCULAR EVERY 10 MIN PRN
Status: DISCONTINUED | OUTPATIENT
Start: 2024-04-15 | End: 2024-04-15 | Stop reason: HOSPADM

## 2024-04-15 RX ORDER — DEXAMETHASONE SODIUM PHOSPHATE 4 MG/ML
INJECTION, SOLUTION INTRA-ARTICULAR; INTRALESIONAL; INTRAMUSCULAR; INTRAVENOUS; SOFT TISSUE
Status: DISCONTINUED | OUTPATIENT
Start: 2024-04-15 | End: 2024-04-15

## 2024-04-15 RX ORDER — FENTANYL CITRATE 50 UG/ML
INJECTION, SOLUTION INTRAMUSCULAR; INTRAVENOUS
Status: DISCONTINUED | OUTPATIENT
Start: 2024-04-15 | End: 2024-04-15

## 2024-04-15 RX ORDER — PROPOFOL 10 MG/ML
VIAL (ML) INTRAVENOUS
Status: DISCONTINUED | OUTPATIENT
Start: 2024-04-15 | End: 2024-04-15

## 2024-04-15 RX ORDER — ROCURONIUM BROMIDE 10 MG/ML
INJECTION, SOLUTION INTRAVENOUS
Status: DISCONTINUED | OUTPATIENT
Start: 2024-04-15 | End: 2024-04-15

## 2024-04-15 RX ORDER — SODIUM CHLORIDE 0.9 % (FLUSH) 0.9 %
10 SYRINGE (ML) INJECTION
Status: DISCONTINUED | OUTPATIENT
Start: 2024-04-15 | End: 2024-04-15 | Stop reason: HOSPADM

## 2024-04-15 RX ORDER — ONDANSETRON HYDROCHLORIDE 2 MG/ML
INJECTION, SOLUTION INTRAVENOUS
Status: DISCONTINUED | OUTPATIENT
Start: 2024-04-15 | End: 2024-04-15

## 2024-04-15 RX ADMIN — FENTANYL CITRATE 100 MCG: 50 INJECTION, SOLUTION INTRAMUSCULAR; INTRAVENOUS at 08:04

## 2024-04-15 RX ADMIN — DEXAMETHASONE SODIUM PHOSPHATE 4 MG: 4 INJECTION, SOLUTION INTRAMUSCULAR; INTRAVENOUS at 08:04

## 2024-04-15 RX ADMIN — SUCCINYLCHOLINE CHLORIDE 140 MG: 20 INJECTION, SOLUTION INTRAMUSCULAR; INTRAVENOUS at 08:04

## 2024-04-15 RX ADMIN — SODIUM CHLORIDE: 9 INJECTION, SOLUTION INTRAVENOUS at 07:04

## 2024-04-15 RX ADMIN — LIDOCAINE HYDROCHLORIDE 100 MG: 20 INJECTION INTRAVENOUS at 08:04

## 2024-04-15 RX ADMIN — ONDANSETRON 4 MG: 2 INJECTION INTRAMUSCULAR; INTRAVENOUS at 08:04

## 2024-04-15 RX ADMIN — ROCURONIUM BROMIDE 10 MG: 10 INJECTION INTRAVENOUS at 08:04

## 2024-04-15 RX ADMIN — PROPOFOL 200 MG: 10 INJECTION, EMULSION INTRAVENOUS at 08:04

## 2024-04-15 NOTE — ANESTHESIA PROCEDURE NOTES
Intubation    Date/Time: 4/15/2024 8:12 AM    Performed by: Radha Vaughn CRNA  Authorized by: Ree Mitchell MD    Intubation:     Induction:  Intravenous    Intubated:  Postinduction    Mask Ventilation:  Easy mask    Attempts:  1    Attempted By:  CRNA    Method of Intubation:  Video laryngoscopy    Blade:  Butcher 3    Laryngeal View Grade: Grade I - full view of cords      Difficult Airway Encountered?: No      Complications:  None    Airway Device:  Oral endotracheal tube    Airway Device Size:  7.5    Style/Cuff Inflation:  Cuffed (inflated to minimal occlusive pressure)    Placement Verified By:  Capnometry    Findings Post-Intubation:  BS equal bilateral and atraumatic/condition of teeth unchanged

## 2024-04-15 NOTE — PLAN OF CARE
Patient is stable and ready for discharge. Instructions and report given to patient . Questions answered. Patient tolerating po liquids with no difficulty. Patient has no pain or states it is a tolerable level for them. Anesthesia consent and surgical consent in chart.

## 2024-04-15 NOTE — TRANSFER OF CARE
"Anesthesia Transfer of Care Note    Patient: Evaristo Ty    Procedure(s) Performed: Procedure(s) (LRB):  ENTEROSCOPY, DOUBLE BALLOON, ANTEGRADE (N/A)    Patient location: PACU    Anesthesia Type: general    Transport from OR: Transported from OR on room air with adequate spontaneous ventilation    Post pain: adequate analgesia    Post assessment: no apparent anesthetic complications and tolerated procedure well    Post vital signs: stable    Level of consciousness: awake, alert and oriented    Nausea/Vomiting: no nausea/vomiting    Complications: none    Transfer of care protocol was followed      Last vitals: Visit Vitals  /76   Pulse 88   Temp 37.1 °C (98.7 °F) (Oral)   Resp 16   Ht 6' 1" (1.854 m)   Wt 88.5 kg (195 lb)   SpO2 98%   BMI 25.73 kg/m²     "

## 2024-04-15 NOTE — ANESTHESIA POSTPROCEDURE EVALUATION
Anesthesia Post Evaluation    Patient: Evaristo Ty    Procedure(s) Performed: Procedure(s) (LRB):  ENTEROSCOPY, DOUBLE BALLOON, ANTEGRADE (N/A)    Final Anesthesia Type: general      Patient location during evaluation: [facility]  Patient participation: Yes- Able to Participate  Level of consciousness: awake and alert  Post-procedure vital signs: reviewed and stable  Pain management: adequate  Airway patency: patent    PONV status at discharge: No PONV  Anesthetic complications: no      Cardiovascular status: blood pressure returned to baseline  Respiratory status: unassisted  Hydration status: euvolemic  Follow-up not needed.              Vitals Value Taken Time   /67 04/15/24 0946   Temp 36.9 °C (98.4 °F) 04/15/24 0858   Pulse 82 04/15/24 0950   Resp 16 04/15/24 0950   SpO2 97 % 04/15/24 0950   Vitals shown include unfiled device data.      No case tracking events are documented in the log.      Pain/Padma Score: Padma Score: 10 (4/15/2024  9:45 AM)

## 2024-04-15 NOTE — H&P
Short Stay Endoscopy History and Physical      Procedure - Antegrade double-balloon enteroscopy  ASA - per anesthesia  Mallampati - per anesthesia  History of Anesthesia problems - no  Family history Anesthesia problems - no   Plan of anesthesia - General    HPI:  This is a 33 y.o. male here for evaluation of left sided abdominal pain, abnormal CT of the small bowel suspicious for enteritis, and VCE revealing small bowel erosions.       ROS:  Constitutional: No fevers, chills, No weight loss  CV: No chest pain  Pulm: No cough, No shortness of breath  GI: see HPI    Medical History:  has a past medical history of Ulcerative colitis.    Surgical History:  has a past surgical history that includes Colonoscopy (01/2023) and Esophagogastroduodenoscopy.    Family History: family history includes Cancer in his maternal grandfather and maternal grandmother.    Social History:  reports that he has never smoked. He has never used smokeless tobacco. He reports that he does not currently use alcohol. He reports that he does not use drugs.    Review of patient's allergies indicates:  No Known Allergies    Medications:   Medications Prior to Admission   Medication Sig Dispense Refill Last Dose    ALLEGRA ALLERGY 180 mg tablet Take 180 mg by mouth once daily.   Past Month    budesonide (ENTOCORT EC) 3 mg capsule Take 2 capsules (6 mg total) by mouth once daily. 60 capsule 3 Past Month    ferrous sulfate (FEOSOL) Tab tablet Take 1 tablet by mouth daily with breakfast.   Past Month    mesalamine (LIALDA) 1.2 gram TbEC Take 2.4 g by mouth once daily.   Past Month    pantoprazole (PROTONIX) 40 MG tablet Take 1 tablet (40 mg total) by mouth once daily. 30 tablet 3 Past Month         Physical Exam:    Vital Signs:   Vitals:    04/15/24 0720   BP: 130/72   Pulse:    Resp:    Temp:        General Appearance: Well appearing in no acute distress  Eyes:  PERRL  Lungs: CTA bilaterally  Heart:  Reg rate and rhythm  Abdomen: Soft, non  "tender, non distended with positive bowel sounds.      Labs:  Lab Results   Component Value Date    WBC 11.59 (H) 01/20/2024    HGB 13.1 (L) 02/14/2024    HCT 39.0 (L) 02/14/2024    MCV 92.3 01/20/2024     01/20/2024        BMP  Lab Results   Component Value Date     01/20/2024    K 3.6 01/20/2024     01/20/2024    CO2 27 01/20/2024    BUN 17 01/20/2024    CREATININE 0.91 01/20/2024    CALCIUM 9.5 01/20/2024    ANIONGAP 8 01/20/2024     No results found for: "INR", "PROTIME"       Assessment:  33 y.o. male with abnormal CT of the small bowel suspicious for Crohn's disease.     Plan:  Proceed with antegrade double-balloon enteroscopy today.  I have explained the risks and benefits of endoscopy procedures to the patient including but not limited to bleeding, perforation, infection, and death.  All questions and answered.        Aime Duncan MD    "

## 2024-04-15 NOTE — ANESTHESIA PREPROCEDURE EVALUATION
[facility]  Anesthesia Pre-Operative Evaluation       Patient Name: Evaristo Ty  YOB: 1990  MRN: [id number]  [facility]: [id number]      Code Status: Prior   Date of Procedure: 4/15/2024  Anesthesia: General/MAC Procedure: Procedure(s) (LRB):  ENTEROSCOPY, DOUBLE BALLOON, ANTEGRADE (N/A)  Pre-Operative Diagnosis: Enteritis [K52.9]  Proceduralist: Surgeons and Role:     * Aime Duncan MD - Primary        SUBJECTIVE:   Evaristo Ty is a 33 y.o. male who  has a past medical history of Ulcerative colitis. No notes on file    Anticoagulants   Medication Route Frequency       he has a current medication list which includes the following long-term medication(s): allegra allergy, mesalamine, and pantoprazole.   ALLERGIES:   Review of patient's allergies indicates:  No Known Allergies  LDA:          Lines/Drains/Airways       Peripheral Intravenous Line  Duration                  Peripheral IV - Single Lumen 04/15/24 0720 20 G Posterior;Right Hand <1 day                  MEDICATIONS:     Antibiotics (From admission, onward)      None          VTE Risk Mitigation (From admission, onward)      None          Current Facility-Administered Medications   Medication Dose Route Frequency Provider Last Rate Last Admin    0.9%  NaCl infusion   Intravenous Continuous Aime Duncan MD 10 mL/hr at 04/15/24 0724 New Bag at 04/15/24 0724          History:   There are no hospital problems to display for this patient.    Surgical History:    has a past surgical history that includes Colonoscopy (01/2023) and Esophagogastroduodenoscopy.   Social History:    has no history on file for sexual activity.  reports that he has never smoked. He has never used smokeless tobacco. He reports that he does not currently use alcohol. He reports that he does not use drugs.     OBJECTIVE:     Vital Signs (Most Recent):  Temp: 37.1 °C (98.7 °F) (04/15/24 0719)  Pulse: 81 (04/15/24 0719)  Resp: 16  "(04/15/24 0719)  BP: 130/72 (04/15/24 0720)  SpO2: 98 % (04/15/24 0719) Vital Signs Range (Last 24H):  Temp:  [37.1 °C (98.7 °F)]   Pulse:  [81]   Resp:  [16]   BP: (130)/(72)   SpO2:  [98 %]        Body mass index is 25.73 kg/m².   Wt Readings from Last 4 Encounters:   04/15/24 88.5 kg (195 lb)   02/14/24 95.4 kg (210 lb 6.4 oz)   01/20/24 93.4 kg (206 lb)   01/17/24 93.8 kg (206 lb 12.8 oz)     Significant Labs:  Lab Results   Component Value Date    WBC 11.59 (H) 01/20/2024    HGB 13.1 (L) 02/14/2024    HCT 39.0 (L) 02/14/2024     01/20/2024     01/20/2024    K 3.6 01/20/2024     01/20/2024    CREATININE 0.91 01/20/2024    BUN 17 01/20/2024    CO2 27 01/20/2024    GLU 98 01/20/2024    CALCIUM 9.5 01/20/2024    ALKPHOS 100 01/20/2024    ALT 28 01/20/2024    AST 31 01/20/2024    ALBUMIN 3.5 01/20/2024     No LMP for male patient.  No results found for this or any previous visit (from the past 72 hour(s)).    EKG:   No results found for this or any previous visit.    TTE:  No results found for this or any previous visit.  No results found for: "EF"   No results found for this or any previous visit.  FRENCH:  No results found for this or any previous visit.  Stress Test:  No results found for this or any previous visit.     LHC:  No results found for this or any previous visit.     PFT:  No results found for: "FEV1", "FVC", "JMD5HFS", "TLC", "DLCO"   ASSESSMENT/PLAN:          Pre-op Assessment    I have reviewed the Patient Summary Reports.     I have reviewed the Nursing Notes. I have reviewed the NPO Status.   I have reviewed the Medications.     Review of Systems  Social:  No Alcohol Use, Non-Smoker       Cardiovascular:  Exercise tolerance: good                                           Pulmonary:  Pulmonary Normal                       Hepatic/GI:   PUD,  GERD, well controlled                 Physical Exam  General: Well nourished, Cooperative, Alert and Oriented    Airway:  Mallampati: II / " I  Mouth Opening: Normal  TM Distance: Normal  Tongue: Normal  Neck ROM: Normal ROM    Dental:  Intact        Anesthesia Plan  Type of Anesthesia, risks & benefits discussed:    Anesthesia Type: Gen ETT  Intra-op Monitoring Plan: Standard ASA Monitors  Induction:  IV  Informed Consent: Informed consent signed with the Patient and all parties understand the risks and agree with anesthesia plan.  All questions answered.   ASA Score: 2    Ready For Surgery From Anesthesia Perspective.     .

## 2024-04-15 NOTE — PROVATION PATIENT INSTRUCTIONS
Discharge Summary/Instructions after an Endoscopic Procedure  Patient Name: Evaristo Ty  Patient MRN: [id number]  Patient YOB: 1990  Monday, April 15, 2024  Aime Duncan MD  Dear patient,  As a result of recent federal legislation (The Federal Cures Act), you may   receive lab or pathology results from your procedure in your [facility]   account before your physician is able to contact you. Your physician or   their representative will relay the results to you with their   recommendations at their soonest availability.  Thank you,  RESTRICTIONS:  During your procedure today, you received medications for sedation.  These   medications may affect your judgment, balance and coordination.  Therefore,   for 24 hours, you have the following restrictions:   - DO NOT drive a car, operate machinery, make legal/financial decisions,   sign important papers or drink alcohol.    ACTIVITY:  Today: no heavy lifting, straining or running due to procedural   sedation/anesthesia.  The following day: return to full activity including work.  DIET:  Eat and drink normally unless instructed otherwise.     TREATMENT FOR COMMON SIDE EFFECTS:  - Mild abdominal pain, nausea, belching, bloating or excessive gas:  rest,   eat lightly and use a heating pad.  - Sore Throat: treat with throat lozenges and/or gargle with warm salt   water.  - Because air was used during the procedure, expelling large amounts of air   from your rectum or belching is normal.  - If a bowel prep was taken, you may not have a bowel movement for 1-3 days.    This is normal.  SYMPTOMS TO WATCH FOR AND REPORT TO YOUR PHYSICIAN:  1. Abdominal pain or bloating, other than gas cramps.  2. Chest pain.  3. Back pain.  4. Signs of infection such as: chills or fever occurring within 24 hours   after the procedure.  5. Rectal bleeding, which would show as bright red, maroon, or black stools.   (A tablespoon of blood from the rectum is not serious, especially  if   hemorrhoids are present.)  6. Vomiting.  7. Weakness or dizziness.  GO DIRECTLY TO THE NEAREST EMERGENCY ROOM IF YOU HAVE ANY OF THE FOLLOWING:      Difficulty breathing              Chills and/or fever over 101 F   Persistent vomiting and/or vomiting blood   Severe abdominal pain   Severe chest pain   Black, tarry stools   Bleeding- more than one tablespoon   Any other symptom or condition that you feel may need urgent attention  Your doctor recommends these additional instructions:  If any biopsies were taken, your doctors clinic will contact you in 1 to 2   weeks with any results.  - Discharge patient to home.   - Patient has a contact number available for emergencies.  The signs and   symptoms of potential delayed complications were discussed with the   patient.  Return to normal activities tomorrow.  Written discharge   instructions were provided to the patient.   - Resume previous diet.   - Continue present medications.   - Await pathology results.   - Return to referring physician.  For questions, problems or results please call your physician - Aime Duncan MD at Work:  [phone].  [facility], EMERGENCY ROOM PHONE NUMBER: [phone]  IF A COMPLICATION OR EMERGENCY SITUATION ARISES AND YOU ARE UNABLE TO REACH   YOUR PHYSICIAN - GO DIRECTLY TO THE EMERGENCY ROOM.  Aime Duncan MD  4/15/2024 8:55:36 AM  This report has been verified and signed electronically.  Dear patient,  As a result of recent federal legislation (The Federal Cures Act), you may   receive lab or pathology results from your procedure in your [facility]   account before your physician is able to contact you. Your physician or   their representative will relay the results to you with their   recommendations at their soonest availability.  Thank you,  PROVATION

## 2024-04-18 ENCOUNTER — PATIENT MESSAGE (OUTPATIENT)
Dept: GASTROENTEROLOGY | Facility: CLINIC | Age: 34
End: 2024-04-18
Payer: OTHER GOVERNMENT

## 2024-04-18 LAB
FINAL PATHOLOGIC DIAGNOSIS: NORMAL
GROSS: NORMAL
Lab: NORMAL

## 2024-04-23 ENCOUNTER — OFFICE VISIT (OUTPATIENT)
Dept: GASTROENTEROLOGY | Facility: CLINIC | Age: 34
End: 2024-04-23
Payer: OTHER GOVERNMENT

## 2024-04-23 VITALS
SYSTOLIC BLOOD PRESSURE: 127 MMHG | HEART RATE: 90 BPM | WEIGHT: 198 LBS | OXYGEN SATURATION: 99 % | HEIGHT: 73 IN | BODY MASS INDEX: 26.24 KG/M2 | DIASTOLIC BLOOD PRESSURE: 70 MMHG

## 2024-04-23 DIAGNOSIS — K50.818 CROHN'S DISEASE OF BOTH SMALL AND LARGE INTESTINE WITH OTHER COMPLICATION: Primary | ICD-10-CM

## 2024-04-23 DIAGNOSIS — D50.9 IRON DEFICIENCY ANEMIA, UNSPECIFIED IRON DEFICIENCY ANEMIA TYPE: ICD-10-CM

## 2024-04-23 DIAGNOSIS — K52.9 ENTERITIS: ICD-10-CM

## 2024-04-23 DIAGNOSIS — K52.9 INFLAMMATORY BOWEL DISEASE: ICD-10-CM

## 2024-04-23 PROCEDURE — 99215 OFFICE O/P EST HI 40 MIN: CPT | Mod: S$PBB,,, | Performed by: INTERNAL MEDICINE

## 2024-04-23 PROCEDURE — 99213 OFFICE O/P EST LOW 20 MIN: CPT | Mod: PBBFAC | Performed by: INTERNAL MEDICINE

## 2024-04-23 RX ORDER — ADALIMUMAB 40MG/0.4ML
40 KIT SUBCUTANEOUS
Qty: 2 PEN | Refills: 2 | Status: SHIPPED | OUTPATIENT
Start: 2024-04-23 | End: 2024-07-16

## 2024-04-23 RX ORDER — FOLIC ACID 1 MG/1
5 TABLET ORAL WEEKLY
Qty: 20 TABLET | Refills: 11 | OUTPATIENT
Start: 2024-04-23 | End: 2024-05-08 | Stop reason: SDUPTHER

## 2024-04-23 RX ORDER — METHOTREXATE 2.5 MG/1
15 TABLET ORAL
Qty: 24 TABLET | Refills: 11 | OUTPATIENT
Start: 2024-04-23 | End: 2024-05-08 | Stop reason: SDUPTHER

## 2024-04-23 NOTE — PROGRESS NOTES
Chief Complaint: Crohn's disease     HPI: 33 y.o. male with h/o original diagnosis of ulcerative colitis that occurred many years ago. He has been following with Dr. Morris and Nataly Madrid NP  He recently was found to have enteritis and had a small bowel enteroscopy which showed inflammation that was biopsied and confirmed to be Crohn's disease. 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 The most recent colonoscopy 12/20/2023 showed normal mucosa of the TI and colon. Path without significant microscopic pathologic change in TI or colon. We do not have his old records showing colonic inflammation.    EGD 2/2024 showed that the distal esophagus had an erosion consistent with reflux esophagitis which was biopsied. The stomach and duodenum had mild erythema and biopsies were obtained. Focally acute duodenitis with villous blunting was noted, no increase in intraepithelial lymphocytes, gastritis, h.pylori negative, rare intraepithelial eosinophils consistent with reflux esophagitis     MR enterography showed submucosal enhancement suspicious for proctitis but otherwise no abnormal bowel wall thickening.    CT in January of 2024 showed moderate length segment of small bowel within the left upper quadrant which demonstrates some wall thickening suspicious for enteritis.  There are mildly prominent nonspecific left upper quadrant mesenteric lymph nodes in the left periaortic lymph nodes which may be reactive.    VCE with gastritis and duodenitis, section of inflammation and mucosal erosion was noted at 25 minutes during the exam. Given the concern for Crohn's disease he was sent for small bowel enteroscopy at [facility] with Dr. Duncan. Enteroscopy showed duodenitis, erosions, erythema, friability and mucus, also jejunal inflammation charcharacterized by congestion (edema), erosions, erythema, friability and mucus. Path with severe chronic active enteritis with ulceration and mucosal remodeling, negative for  granuloma, dysplasia or malignancy. Immunostain for CMV negative.    Interval hx:  He endorses a history of intermittent abdominal pain that would come in waves and be pretty severe. He states that these symptoms would eventually resolve on their own. He has had several CRPs checked in the past which have been elevated starting approximately 4 months ago which went from 4 to 6 then to 9.  He was placed on mesalamine and budesonide and most recent CRP was 1.8. Calprotectin was checked and was 106.  [place] colitis and that is when he was first diagnosed with ulcerative colitis. He will try to get those records and send to us.    Medical records reviewed. Additional history supplemented by nursing.     IBD History:   IBD disease: Crohn's disease  Disease location: Jejunum, suspected colonic based on history of original diagnosis as UC but we do not have those records  Disease behavior: Inflammatory  Current therapy:  Mesalamine, budesonide  Prior therapy: Mesalamine, budesonide  Surgeries: None  Complications: None  Extraintestinal manifestations: None    Review of Systems:   General ROS: negative for chills, fever, or weight loss  Ophthalmic ROS: negative for blurry vision, photophobia, or eye pain  ENT ROS: negative for oral ulcers, sore throat or rhinorrhea  Respiratory ROS: no cough, shortness of breath, or wheezing  Cardiovascular ROS: no chest pain, palpitations, or dyspnea on exertion  Gastrointestinal ROS: per HPI  Musculoskeletal ROS: no arthralgias, myalgias, joint swelling or erythema  Dermatological ROS: negative for pruritis, rash, ulcers, nodules    Past Medical History:  Inflammatory bowel disease    Past Surgical History:  None    Social History:  Tobacco use: None  NSAIDS use: None  Narcotic use: None  Alcohol use: None  Illicit drug use: None  Employment: [employer]     Family  History:   There is no known family history of IBD, CRC, or celiac  "disease.     Allergies and Medications reviewed    Physical Examination:  /70   Pulse 90   Ht 6' 1" (1.854 m)   Wt 89.8 kg (198 lb)   SpO2 99%   BMI 26.12 kg/m²   General appearance: alert, cooperative, no distress  HENT: Normocephalic, atraumatic, neck symmetrical  Eyes: conjunctivae clear  Lungs: No labored breathing  Skin: No jaundice  Neurologic: Alert and oriented X 3    Most recent Labs/Stool studies:  Lab Results   Component Value Date    WBC 11.59 (H) 01/20/2024    HGB 13.1 (L) 02/14/2024     Lab Results   Component Value Date    CREATININE 0.91 01/20/2024     Lab Results   Component Value Date    ALT 28 01/20/2024    AST 31 01/20/2024    ALKPHOS 100 01/20/2024    BILITOT 0.3 01/20/2024     No results found for: "ONOVXVAF57"  No components found for: "25OHVITDTOT"  Lab Results   Component Value Date    FERRITIN 103 02/14/2024     Lab Results   Component Value Date    CRP 1.87 (H) 02/14/2024     Most recent Imaging:      Most recent Endoscopy:   EGD:   Overall Impression: Performed forceps biopsies in the esophagus, stomach and duodenum. Esophagitis in the lower third of the esophagus; performed cold forceps biopsy. The distal esophagus had an erosion, consistent with reflux esophagitis. The distal esophagus was biopsied. The stomach and duodenal had mild erythema and biopsies were obtained.  Path:  A. Duodenum, biopsy:  - Focally acute duodenitis with villous blunting  - No increase in intraepithelial lymphocytes     B. Stomach, biopsy:  - Antral and oxyntic mucosa with mild chronic gastritis  - Helicobacter pylori immunohistochemistry is negative     C. Esophagus, biopsy:  - Inflamed squamous mucosa with basal cell hyperplasia, spongiosis, focal parakeratosis, and rare intraepithelial eosinophils consistent with reflux esophagitis  - GMS stain is negative for fungal organisms        Colonoscopy: 2023  Performed forceps biopsies in the terminal ileum  Performed forceps biopsies in the ascending " colon, transverse colon, descending colon, sigmoid colon and rectum  Mucosa of the colon and terminal ileum is normal appearing; multiple biopsies were obtained.  PATH:  A. Terminal ileum, biopsy:  No significant microscopic pathologic change     B. Ascending colon, biopsy:  No significant microscopic pathologic change     C. Transverse colon, biopsies:  No significant microscopic pathologic change     D. Descending colon, biopsy:  No significant microscopic pathologic change     E. Sigmoid colon, biopsies:  No significant microscopic pathologic change     F. Rectum, biopsies:  No significant microscopic pathologic change    Assessment and Plan: 33 y.o. male with    1. 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 Elevated CRP  3. Elevated fecal calprotectin  4. Symptomatic with abdominal pain      Today I have discussed with patient about the recent diagnosis of jejunal Crohn's disease. We reviewed the recent endoscopy findings and pathology results. We reviewed current symptoms. He has disease mainly seen on enteroscopy in the jejunum. Although he has a prior diagnosis of colonic disease, we do not have those findings on the most recent colonoscopy. This has changed his diagnosis to Crohn's disease. CRP has been elevated at 6.26. Fecal calprotectin was elevated at 100.     We reviewed options for medical management going forward including anti-TNF agents, anti-integrin, anti-interleukins and MOI inhibitors. Due to the severity and location of disease we discussed anti-TNFs as the best option for small bowel Crohn's disease. He is more amenable to injectable at this time due to him being a  and having to travel frequently.    Will plan for combination therapy with MTX 15 mg weekly and folic acid 5 mg weekly.    Will plan to start tapering budesonide and discontinuing mesalamine.      I have ordered basic labs for patient to obtain including vitamin levels, hepatitis serologies and TB testing  in preparation for biologic therapy.     Patient will need the vaccinations listed below.     I will perform repeat CRP, fecal calprotectin and VCE in 6 months to ensure endoscopic remission      40 minutes of total time spent on the encounter, which includes face to face time and non-face to face time preparing to see the patient (eg, review of tests), obtaining and/or reviewing separately obtained history, documenting clinical information in the electronic or other health record, Independently interpreting results (not separately reported) and communicating results to the patient/family/caregiver, or care coordination (not separately reported).        ----------------------------------------------------------------------------------------------------------------------  Health Maintenance:   -Pneumonia: recommend  -Flu Shot: Recommend annually  -Shingrix: Recommend    -Hepatitis A/Hepatitis B: Check antibody and if not immune will vaccinate    -Annual skin exam: Recommend   -Colon cancer screening: Risks factors: >1/3 of colon involved with colitis and >8-10 years of IBD duration, Distribution of colonic disease: none identified, but seen in past  , Year of symptom onset: 2024  -DEXA scan: Recommend  -Tobacco: Avoid  -Should avoid NSAIDs and narcotics, use only Tylenol for pain relief.     Follow up: RTC 2 months    Kana Mcnair MD  [facility]

## 2024-04-23 NOTE — PROGRESS NOTES
"CC:     HPI 33 y.o. male    Overall Impression:   Performed forceps biopsies in the terminal ileum  Performed forceps biopsies in the ascending colon, transverse colon, descending colon, sigmoid colon and rectum  Mucosa of the colon and terminal ileum is normal appearing; multiple biopsies were obtained.  Path, all bx showed no significant microscopic pathologic change.    Medical records reviewed. Additional history supplemented by nursing.     Past Medical History:   Diagnosis Date    Ulcerative colitis          Review of Systems  General ROS: negative for chills, fever or weight loss  Cardiovascular ROS: no chest pain or dyspnea on exertion  Gastrointestinal ROS: no abdominal pain, change in bowel habits, or black/ bloody stools    Physical Examination  /70   Pulse 90   Ht 6' 1" (1.854 m)   Wt 89.8 kg (198 lb)   SpO2 99%   BMI 26.12 kg/m²   General appearance: alert, cooperative, no distress  HENT: Normocephalic, atraumatic, neck symmetrical, no nasal discharge   Lungs: no labored breathing, symmetric chest wall expansion bilaterally  Heart: regular rate and rhythm without rub; no displacement of the PMI   Abdomen: soft, non-tender; bowel sounds normoactive; no organomegaly  Extremities: extremities symmetric; no clubbing, cyanosis, or edema  Neurologic: Alert and oriented X 3, normal strength, normal coordination and gait    Labs:  Lab Results   Component Value Date    WBC 11.59 (H) 01/20/2024    HGB 13.1 (L) 02/14/2024    HCT 39.0 (L) 02/14/2024    MCV 92.3 01/20/2024     01/20/2024       CMP  Sodium   Date Value Ref Range Status   01/20/2024 137 136 - 145 mmol/L Final     Potassium   Date Value Ref Range Status   01/20/2024 3.6 3.5 - 5.1 mmol/L Final     Chloride   Date Value Ref Range Status   01/20/2024 106 98 - 107 mmol/L Final     CO2   Date Value Ref Range Status   01/20/2024 27 21 - 32 mmol/L Final     Glucose   Date Value Ref Range Status   01/20/2024 98 74 - 106 mg/dL Final     BUN "   Date Value Ref Range Status   2024 17 7 - 18 mg/dL Final     Creatinine   Date Value Ref Range Status   2024 0.91 0.70 - 1.30 mg/dL Final     Calcium   Date Value Ref Range Status   2024 9.5 8.5 - 10.1 mg/dL Final     Total Protein   Date Value Ref Range Status   2024 7.7 6.4 - 8.2 g/dL Final     Albumin   Date Value Ref Range Status   2024 3.5 3.5 - 5.0 g/dL Final     Bilirubin, Total   Date Value Ref Range Status   2024 0.3 >0.0 - 1.2 mg/dL Final     Alk Phos   Date Value Ref Range Status   2024 100 45 - 115 U/L Final     AST   Date Value Ref Range Status   2024 31 15 - 37 U/L Final     ALT   Date Value Ref Range Status   2024 28 16 - 61 U/L Final     Anion Gap   Date Value Ref Range Status   2024 8 7 - 16 mmol/L Final       Imagin2024  CT ABDOMEN PELVIS WITH IV CONTRAST     CLINICAL HISTORY:  Abdominal abscess/infection suspected;     COMPARISON:  None     TECHNIQUE:  Multiple axial tomographic images of the abdomen and pelvis were obtained after administration of 100 cc Isovue 370 intravenous contrast.     FINDINGS:  Mild dependent changes of the lungs noted.     No worrisome focal hepatic abnormality demonstrated on submitted images.  Visualized gallbladder grossly unremarkable.  Visualized pancreas appears unremarkable.  Spleen grossly unremarkable.     Bilateral adrenal glands grossly unremarkable.  Bilateral kidneys appear grossly unremarkable.  Urinary bladder incompletely distended.  Prostate and seminal vesicles grossly unremarkable.     No convincing evidence of gastrointestinal obstruction or acute appendicitis.  There is a moderate length segment of small bowel within the left upper quadrant which demonstrates some wall thickening suspicious for enteritis.  There are mildly prominent nonspecific left upper quadrant mesenteric lymph nodes and left periaortic lymph nodes which may be reactive.  Vasculature grossly unremarkable.   Visualized osseous and surrounding soft tissue structures demonstrate no acute abnormality.     Impression:     There is a moderate length segment of small bowel within the left upper quadrant which demonstrates some wall thickening suspicious for enteritis. There are mildly prominent nonspecific left upper quadrant mesenteric lymph nodes and left periaortic lymph nodes which may be reactive. Other/detailed findings as above.    Independently reviewed    Assessment:     Plan:      Carla Adams, FNP [facility]

## 2024-05-02 ENCOUNTER — LAB VISIT (OUTPATIENT)
Dept: LAB | Facility: CLINIC | Age: 34
End: 2024-05-02
Payer: OTHER GOVERNMENT

## 2024-05-02 DIAGNOSIS — K50.818 CROHN'S DISEASE OF BOTH SMALL AND LARGE INTESTINE WITH OTHER COMPLICATION: ICD-10-CM

## 2024-05-02 LAB
25(OH)D3 SERPL-MCNC: 25.2 NG/ML
FOLATE SERPL-MCNC: 12.7 NG/ML (ref 3.1–17.5)
HBV SURFACE AG SERPL QL IA: NORMAL
VIT B12 SERPL-MCNC: 547 PG/ML (ref 193–986)

## 2024-05-02 PROCEDURE — 82746 ASSAY OF FOLIC ACID SERUM: CPT | Mod: ,,, | Performed by: CLINICAL MEDICAL LABORATORY

## 2024-05-02 PROCEDURE — 82306 VITAMIN D 25 HYDROXY: CPT | Mod: ,,, | Performed by: CLINICAL MEDICAL LABORATORY

## 2024-05-02 PROCEDURE — 87340 HEPATITIS B SURFACE AG IA: CPT | Mod: ,,, | Performed by: CLINICAL MEDICAL LABORATORY

## 2024-05-02 PROCEDURE — 82607 VITAMIN B-12: CPT | Mod: ,,, | Performed by: CLINICAL MEDICAL LABORATORY

## 2024-05-02 PROCEDURE — 36415 COLL VENOUS BLD VENIPUNCTURE: CPT | Mod: ,,, | Performed by: CLINICAL MEDICAL LABORATORY

## 2024-05-06 ENCOUNTER — PATIENT MESSAGE (OUTPATIENT)
Dept: GASTROENTEROLOGY | Facility: CLINIC | Age: 34
End: 2024-05-06
Payer: OTHER GOVERNMENT

## 2024-05-08 DIAGNOSIS — K50.818 CROHN'S DISEASE OF BOTH SMALL AND LARGE INTESTINE WITH OTHER COMPLICATION: ICD-10-CM

## 2024-05-08 RX ORDER — METHOTREXATE 2.5 MG/1
15 TABLET ORAL
Qty: 24 TABLET | Refills: 11 | Status: SHIPPED | OUTPATIENT
Start: 2024-05-08 | End: 2025-05-08

## 2024-05-08 RX ORDER — FOLIC ACID 1 MG/1
5 TABLET ORAL WEEKLY
Qty: 20 TABLET | Refills: 11 | Status: SHIPPED | OUTPATIENT
Start: 2024-05-08 | End: 2025-05-08

## 2024-05-08 RX ORDER — PANTOPRAZOLE SODIUM 40 MG/1
40 TABLET, DELAYED RELEASE ORAL DAILY
Qty: 30 TABLET | Refills: 3 | Status: SHIPPED | OUTPATIENT
Start: 2024-05-08 | End: 2024-06-05

## 2024-05-08 RX ORDER — ERGOCALCIFEROL 1.25 MG/1
50000 CAPSULE ORAL
Qty: 12 CAPSULE | Refills: 3 | Status: SHIPPED | OUTPATIENT
Start: 2024-05-08 | End: 2024-08-06

## 2024-05-15 ENCOUNTER — PATIENT MESSAGE (OUTPATIENT)
Dept: GASTROENTEROLOGY | Facility: CLINIC | Age: 34
End: 2024-05-15
Payer: OTHER GOVERNMENT

## 2024-05-22 ENCOUNTER — PATIENT MESSAGE (OUTPATIENT)
Dept: GASTROENTEROLOGY | Facility: CLINIC | Age: 34
End: 2024-05-22
Payer: OTHER GOVERNMENT

## 2024-06-05 RX ORDER — PANTOPRAZOLE SODIUM 40 MG/1
40 TABLET, DELAYED RELEASE ORAL
Qty: 90 TABLET | Refills: 1 | Status: SHIPPED | OUTPATIENT
Start: 2024-06-05

## 2024-06-25 ENCOUNTER — PATIENT MESSAGE (OUTPATIENT)
Dept: GASTROENTEROLOGY | Facility: CLINIC | Age: 34
End: 2024-06-25
Payer: OTHER GOVERNMENT

## 2024-06-27 DIAGNOSIS — K50.818 CROHN'S DISEASE OF BOTH SMALL AND LARGE INTESTINE WITH OTHER COMPLICATION: ICD-10-CM

## 2024-06-27 RX ORDER — METHOTREXATE 2.5 MG/1
TABLET ORAL
Qty: 72 TABLET | Refills: 4 | Status: SHIPPED | OUTPATIENT
Start: 2024-06-27

## 2024-07-24 ENCOUNTER — OFFICE VISIT (OUTPATIENT)
Dept: GASTROENTEROLOGY | Facility: CLINIC | Age: 34
End: 2024-07-24
Payer: OTHER GOVERNMENT

## 2024-07-24 VITALS — BODY MASS INDEX: 27.04 KG/M2 | WEIGHT: 204 LBS | HEIGHT: 73 IN

## 2024-07-24 DIAGNOSIS — K50.018: Primary | ICD-10-CM

## 2024-07-24 PROCEDURE — 99999 PR PBB SHADOW E&M-EST. PATIENT-LVL III: CPT | Mod: PBBFAC,,, | Performed by: INTERNAL MEDICINE

## 2024-07-24 PROCEDURE — 99213 OFFICE O/P EST LOW 20 MIN: CPT | Mod: PBBFAC | Performed by: INTERNAL MEDICINE

## 2024-07-24 PROCEDURE — 99215 OFFICE O/P EST HI 40 MIN: CPT | Mod: S$PBB,,, | Performed by: INTERNAL MEDICINE

## 2024-07-24 RX ORDER — FERROUS SULFATE 325(65) MG
325 TABLET, DELAYED RELEASE (ENTERIC COATED) ORAL DAILY
COMMUNITY

## 2024-07-30 ENCOUNTER — PATIENT MESSAGE (OUTPATIENT)
Dept: GASTROENTEROLOGY | Facility: CLINIC | Age: 34
End: 2024-07-30
Payer: OTHER GOVERNMENT

## 2024-08-19 DIAGNOSIS — K50.818 CROHN'S DISEASE OF BOTH SMALL AND LARGE INTESTINE WITH OTHER COMPLICATION: ICD-10-CM

## 2024-08-19 RX ORDER — ADALIMUMAB 40MG/0.4ML
KIT SUBCUTANEOUS
Qty: 2 PEN | Refills: 12 | Status: SHIPPED | OUTPATIENT
Start: 2024-08-19 | End: 2024-09-18

## 2024-11-13 ENCOUNTER — OFFICE VISIT (OUTPATIENT)
Dept: GASTROENTEROLOGY | Facility: CLINIC | Age: 34
End: 2024-11-13
Payer: OTHER GOVERNMENT

## 2024-11-13 VITALS
HEIGHT: 73 IN | SYSTOLIC BLOOD PRESSURE: 114 MMHG | BODY MASS INDEX: 27.57 KG/M2 | DIASTOLIC BLOOD PRESSURE: 63 MMHG | WEIGHT: 208 LBS | HEART RATE: 59 BPM | OXYGEN SATURATION: 97 %

## 2024-11-13 DIAGNOSIS — K63.9 DISEASE OF INTESTINE, UNSPECIFIED: ICD-10-CM

## 2024-11-13 DIAGNOSIS — K50.018 CROHN'S DISEASE OF SMALL INTESTINE WITH OTHER COMPLICATION: Primary | ICD-10-CM

## 2024-11-13 PROCEDURE — 99215 OFFICE O/P EST HI 40 MIN: CPT | Mod: S$PBB,,, | Performed by: INTERNAL MEDICINE

## 2024-11-13 PROCEDURE — 99999 PR PBB SHADOW E&M-EST. PATIENT-LVL IV: CPT | Mod: PBBFAC,,, | Performed by: INTERNAL MEDICINE

## 2024-11-13 PROCEDURE — 99214 OFFICE O/P EST MOD 30 MIN: CPT | Mod: PBBFAC | Performed by: INTERNAL MEDICINE

## 2024-11-13 RX ORDER — ERGOCALCIFEROL 1.25 MG/1
50000 CAPSULE ORAL
Qty: 8 CAPSULE | Refills: 10 | Status: SHIPPED | OUTPATIENT
Start: 2024-11-13 | End: 2025-01-02

## 2024-11-13 NOTE — PROGRESS NOTES
Chief Complaint: Crohn's disease     HPI: 33 y.o. male with jejunal Crohn's disease here for follow up. He had a small bowel enteroscopy which showed inflammation that was biopsied and confirmed to be Crohn's disease. The most recent colonoscopy 12/20/2023 showed normal mucosa of the TI and colon. Path without significant microscopic pathologic change in TI or colon.     EGD 2/2024 showed that the distal esophagus had an erosion consistent with reflux esophagitis which was biopsied. The stomach and duodenum had mild erythema and biopsies were obtained. Focally acute duodenitis with villous blunting was noted, no increase in intraepithelial lymphocytes, gastritis, h.pylori negative, rare intraepithelial eosinophils consistent with reflux esophagitis     MR enterography showed submucosal enhancement suspicious for proctitis but otherwise no abnormal bowel wall thickening.    CT in January of 2024 showed moderate length segment of small bowel within the left upper quadrant which demonstrates some wall thickening suspicious for enteritis.  There are mildly prominent nonspecific left upper quadrant mesenteric lymph nodes in the left periaortic lymph nodes which may be reactive.    VCE with gastritis and duodenitis, section of inflammation and mucosal erosion was noted at 25 minutes during the exam. Given the concern for Crohn's disease he was sent for small bowel enteroscopy at [facility] with Dr. Duncan. Enteroscopy showed duodenitis, erosions, erythema, friability and mucus, also jejunal inflammation charcharacterized by congestion (edema), erosions, erythema, friability and mucus. Path with severe chronic active enteritis with ulceration and mucosal remodeling, negative for granuloma, dysplasia or malignancy. Immunostain for CMV negative.    He endorses a history of intermittent abdominal pain that would come in waves and be pretty severe. He states that these symptoms would eventually resolve on their own. He  "has had several CRPs checked in the past which have been elevated starting approximately 4 months ago which went from 4 to 6 then to 9.  He was placed on mesalamine and budesonide and most recent CRP was 1.8. Calprotectin was checked and was 106.  [place] and that is when he was first diagnosed with ulcerative colitis.     Interval hx:  Remains on Humira 40 mg every 2 weeks and methotrexate 15 mg weekly. Overall feeling better. Denies abdominal pain. Has gained weight, about 10 lbs. No fevers, chills. No nausea or vomiting. No episodes of abdominal pain.    Denies mouth ulcers or skin rashes. Has rash on right inner groin suspects tinea. No perianal boils or abscesses. No red eyes or painful skin issues. No joint pains currently.     He brought in old records which will be scanned to media.    IBD History:   IBD disease: Crohn's disease  Disease location: Jejunum  Disease behavior: Inflammatory  Current therapy:  Humira 40 mg every 2 weeks and methotrexate 15 mg weekly  Prior therapy: Mesalamine, budesonide  Surgeries: None  Complications: None  Extraintestinal manifestations: None    Past Medical History:  Crohn's disease-jejunal    Past Surgical History:  None    Allergies and Medications reviewed    Physical Examination:  /63   Pulse (!) 59   Ht 6' 1" (1.854 m)   Wt 94.3 kg (208 lb)   SpO2 97%   BMI 27.44 kg/m²   General appearance: alert, cooperative, no distress  HENT: Normocephalic, atraumatic, neck symmetrical  Eyes: conjunctivae clear  Lungs: No labored breathing  Skin: No jaundice  Neurologic: Alert and oriented X 3    Most recent Labs/Stool studies:    Lab Results   Component Value Date    WBC 4.36 (L) 07/30/2024    HGB 13.0 (L) 07/30/2024    HCT 39.5 (L) 07/30/2024    MCV 98.5 (H) 07/30/2024     07/30/2024     CMP  Sodium   Date Value Ref Range Status   07/30/2024 138 136 - 145 mmol/L Final     Potassium   Date Value Ref Range " "Status   07/30/2024 4.0 3.5 - 5.1 mmol/L Final     Chloride   Date Value Ref Range Status   07/30/2024 108 (H) 98 - 107 mmol/L Final     CO2   Date Value Ref Range Status   07/30/2024 28 21 - 32 mmol/L Final     Glucose   Date Value Ref Range Status   07/30/2024 96 74 - 106 mg/dL Final     BUN   Date Value Ref Range Status   07/30/2024 23 (H) 7 - 18 mg/dL Final     Creatinine   Date Value Ref Range Status   07/30/2024 1.01 0.70 - 1.30 mg/dL Final     Calcium   Date Value Ref Range Status   07/30/2024 9.2 8.5 - 10.1 mg/dL Final     Total Protein   Date Value Ref Range Status   07/30/2024 7.4 6.4 - 8.2 g/dL Final     Albumin   Date Value Ref Range Status   07/30/2024 4.0 3.5 - 5.0 g/dL Final     Bilirubin, Total   Date Value Ref Range Status   07/30/2024 0.5 >0.0 - 1.2 mg/dL Final     Alk Phos   Date Value Ref Range Status   07/30/2024 61 45 - 115 U/L Final     AST   Date Value Ref Range Status   07/30/2024 29 15 - 37 U/L Final     ALT   Date Value Ref Range Status   07/30/2024 24 16 - 61 U/L Final     Anion Gap   Date Value Ref Range Status   07/30/2024 6 (L) 7 - 16 mmol/L Final     eGFR   Date Value Ref Range Status   07/30/2024 100 >=60 mL/min/1.73m2 Final       Lab Results   Component Value Date    CCESEVJE42 547 05/02/2024     No components found for: "25OHVITDTOT"  Lab Results   Component Value Date    FERRITIN 103 02/14/2024     Lab Results   Component Value Date    CRP 0.29 07/30/2024     Most recent Imaging:  CT A/P:  There is a moderate length segment of small bowel within the left upper quadrant which demonstrates some wall thickening suspicious for enteritis. There are mildly prominent nonspecific left upper quadrant mesenteric lymph nodes and left periaortic lymph nodes which may be reactive. Other/detailed findings as above.       Most recent Endoscopy:   EGD:   Overall Impression: Performed forceps biopsies in the esophagus, stomach and duodenum. Esophagitis in the lower third of the esophagus; " performed cold forceps biopsy. The distal esophagus had an erosion, consistent with reflux esophagitis. The distal esophagus was biopsied. The stomach and duodenal had mild erythema and biopsies were obtained.  Path:  A. Duodenum, biopsy:  - Focally acute duodenitis with villous blunting  - No increase in intraepithelial lymphocytes     B. Stomach, biopsy:  - Antral and oxyntic mucosa with mild chronic gastritis  - Helicobacter pylori immunohistochemistry is negative     C. Esophagus, biopsy:  - Inflamed squamous mucosa with basal cell hyperplasia, spongiosis, focal parakeratosis, and rare intraepithelial eosinophils consistent with reflux esophagitis  - GMS stain is negative for fungal organisms      Colonoscopy: 2023  Performed forceps biopsies in the terminal ileum  Performed forceps biopsies in the ascending colon, transverse colon, descending colon, sigmoid colon and rectum  Mucosa of the colon and terminal ileum is normal appearing; multiple biopsies were obtained.  PATH:  A. Terminal ileum, biopsy:  No significant microscopic pathologic change     B. Ascending colon, biopsy:  No significant microscopic pathologic change     C. Transverse colon, biopsies:  No significant microscopic pathologic change     D. Descending colon, biopsy:  No significant microscopic pathologic change     E. Sigmoid colon, biopsies:  No significant microscopic pathologic change     F. Rectum, biopsies:  No significant microscopic pathologic change    Assessment and Plan: 33 y.o. male with    1. Jejunal Crohn's disease  2. In clinical remission    Mr. Ty has overall had improvement in symptoms of abdominal pain while on Humira 40 mg every 2 weeks in combination with MTX/FA weekly. Will plan to perform repeat CRP, VCE as well as enterography to ensure endoscopic remission.     He will follow up again in 6 months to discuss disease activity and potentially coming off methotrexate if he is in clinical and endoscopic remission.    Will check adalimumab level and antibody to ensure trough level in adequate range (10-15) at that time.. If not in adequate range he will need optimization of therapy to either 80 mg every other week or 40 mg weekly.    Discussed vaccinations listed below and other health maintenance.     40 minutes of total time spent on the encounter, which includes face to face time and non-face to face time preparing to see the patient (eg, review of tests), obtaining and/or reviewing separately obtained history, documenting clinical information in the electronic or other health record, Independently interpreting results (not separately reported) and communicating results to the patient/family/caregiver, or care coordination (not separately reported).        ----------------------------------------------------------------------------------------------------------------------  Health Maintenance:   -Pneumonia: Recommend  -Flu Shot: Recommend annually  -Shingrix: Recommend    -Hepatitis A/Hepatitis B: Check antibody and if not immune will vaccinate  -Annual skin exam: Recommend   -Colon cancer screening: no colonic disease  -DEXA scan: Can consider in future  -Tobacco: Avoid  -Should avoid NSAIDs and narcotics, use only Tylenol for pain relief.     Follow up: RTC 6 months    Sai Sruthi Veerisetty, MD [facility]

## 2024-11-26 ENCOUNTER — TELEPHONE (OUTPATIENT)
Dept: GASTROENTEROLOGY | Facility: CLINIC | Age: 34
End: 2024-11-26
Payer: OTHER GOVERNMENT

## 2024-11-26 NOTE — TELEPHONE ENCOUNTER
----- Message from Kana Mcnair MD sent at 11/25/2024  3:16 PM CST -----  Please notify patient that he is immune to hepatitis B. Inflammatory marker in the blood is mildly elevated, but this is high sensitivity so will monitor in the future.

## 2025-01-15 ENCOUNTER — HOSPITAL ENCOUNTER (OUTPATIENT)
Dept: RADIOLOGY | Facility: HOSPITAL | Age: 35
Discharge: HOME | End: 2025-01-15
Attending: INTERNAL MEDICINE
Payer: OTHER GOVERNMENT

## 2025-01-15 DIAGNOSIS — K50.018 CROHN'S DISEASE OF SMALL INTESTINE WITH OTHER COMPLICATION: ICD-10-CM

## 2025-01-15 PROCEDURE — A9698 NON-RAD CONTRAST MATERIALNOC: HCPCS | Performed by: INTERNAL MEDICINE

## 2025-01-15 PROCEDURE — 25500020 PHARM REV CODE 255: Performed by: INTERNAL MEDICINE

## 2025-01-15 PROCEDURE — 74177 CT ABD & PELVIS W/CONTRAST: CPT | Mod: TC

## 2025-01-15 RX ORDER — IOPAMIDOL 755 MG/ML
100 INJECTION, SOLUTION INTRAVASCULAR
Status: COMPLETED | OUTPATIENT
Start: 2025-01-15 | End: 2025-01-15

## 2025-01-15 RX ADMIN — IOPAMIDOL 100 ML: 755 INJECTION, SOLUTION INTRAVENOUS at 10:01

## 2025-01-15 RX ADMIN — BARIUM SULFATE 1150 ML: 1 SUSPENSION ORAL at 10:01

## 2025-01-16 ENCOUNTER — TELEPHONE (OUTPATIENT)
Dept: GASTROENTEROLOGY | Facility: CLINIC | Age: 35
End: 2025-01-16
Payer: OTHER GOVERNMENT

## 2025-01-16 NOTE — TELEPHONE ENCOUNTER
----- Message from Kana Mcnair MD sent at 1/15/2025 12:13 PM CST -----  Please notify patient that CT is without any inflammation.

## 2025-01-27 ENCOUNTER — HOSPITAL ENCOUNTER (OUTPATIENT)
Dept: GASTROENTEROLOGY | Facility: HOSPITAL | Age: 35
Discharge: HOME | End: 2025-01-27
Attending: INTERNAL MEDICINE
Payer: OTHER GOVERNMENT

## 2025-01-27 DIAGNOSIS — K50.018 CROHN'S DISEASE OF SMALL INTESTINE WITH OTHER COMPLICATION: ICD-10-CM

## 2025-01-27 PROCEDURE — 27201423 OPTIME MED/SURG SUP & DEVICES STERILE SUPPLY

## 2025-01-27 RX ORDER — SODIUM CHLORIDE 0.9 % (FLUSH) 0.9 %
10 SYRINGE (ML) INJECTION EVERY 6 HOURS PRN
OUTPATIENT
Start: 2025-01-27

## 2025-01-27 RX ORDER — SODIUM CHLORIDE, SODIUM LACTATE, POTASSIUM CHLORIDE, CALCIUM CHLORIDE 600; 310; 30; 20 MG/100ML; MG/100ML; MG/100ML; MG/100ML
INJECTION, SOLUTION INTRAVENOUS CONTINUOUS
OUTPATIENT
Start: 2025-01-27

## 2025-01-27 NOTE — H&P
History & Physical - Short Stay  Gastroenterology      SUBJECTIVE:     Procedure: VCE    Chief Complaint/Indication for Procedure: Crohn's disease of the small bowel-jejunum    (Not in a hospital admission)      Review of patient's allergies indicates:  No Known Allergies     Past Medical History:   Diagnosis Date    Ulcerative colitis      Past Surgical History:   Procedure Laterality Date    COLONOSCOPY  01/2023    ENTEROSCOPY, DOUBLE BALLOON, ANTEGRADE N/A 4/15/2024    Procedure: ENTEROSCOPY, DOUBLE BALLOON, ANTEGRADE;  Surgeon: Aime Duncan MD;  [facility] ([address] [place] [address]);  Service: Endoscopy;  Laterality: N/A;  3/20 portal instr-tt  4/9-lvm for precall-MS  4/9-precall complete-Kpvt    ESOPHAGOGASTRODUODENOSCOPY       Family History   Problem Relation Name Age of Onset    Cancer Maternal Grandmother      Cancer Maternal Grandfather       Social History     Tobacco Use    Smoking status: Never    Smokeless tobacco: Never   Substance Use Topics    Alcohol use: Not Currently    Drug use: Never     OBJECTIVE:     Physical Exam:                                                       General appearance: alert, cooperative, no distress, comfortable appearing  HENT: Normocephalic, atraumatic, neck symmetrical  Eyes: conjunctivae clear  Lungs: No labored breathing  Abd: Soft, non-tender    ASSESSMENT/PLAN:     Assessment: Crohn's disease of the small bowel-jejunum    Plan: VCE

## 2025-01-30 RX ORDER — PANTOPRAZOLE SODIUM 40 MG/1
40 TABLET, DELAYED RELEASE ORAL
Qty: 90 TABLET | Refills: 1 | Status: SHIPPED | OUTPATIENT
Start: 2025-01-30

## 2025-03-31 ENCOUNTER — TELEPHONE (OUTPATIENT)
Dept: GASTROENTEROLOGY | Facility: CLINIC | Age: 35
End: 2025-03-31
Payer: OTHER GOVERNMENT

## 2025-03-31 NOTE — TELEPHONE ENCOUNTER
Spoke w/ pt - results of bowel capsule reviewed - advised it was ok to stop methotrexate and cont w/ Humira - good vu noted

## 2025-05-28 ENCOUNTER — OFFICE VISIT (OUTPATIENT)
Dept: GASTROENTEROLOGY | Facility: CLINIC | Age: 35
End: 2025-05-28
Payer: OTHER GOVERNMENT

## 2025-05-28 ENCOUNTER — RESULTS FOLLOW-UP (OUTPATIENT)
Dept: GASTROENTEROLOGY | Facility: CLINIC | Age: 35
End: 2025-05-28

## 2025-05-28 VITALS
SYSTOLIC BLOOD PRESSURE: 111 MMHG | DIASTOLIC BLOOD PRESSURE: 75 MMHG | HEIGHT: 73 IN | OXYGEN SATURATION: 96 % | BODY MASS INDEX: 28.31 KG/M2 | HEART RATE: 55 BPM | WEIGHT: 213.63 LBS

## 2025-05-28 DIAGNOSIS — K50.018 CROHN'S DISEASE OF SMALL INTESTINE WITH OTHER COMPLICATION: Primary | ICD-10-CM

## 2025-05-28 DIAGNOSIS — K50.818 CROHN'S DISEASE OF BOTH SMALL AND LARGE INTESTINE WITH OTHER COMPLICATION: ICD-10-CM

## 2025-05-28 PROCEDURE — 99999 PR PBB SHADOW E&M-EST. PATIENT-LVL III: CPT | Mod: PBBFAC,,, | Performed by: INTERNAL MEDICINE

## 2025-05-28 PROCEDURE — 99213 OFFICE O/P EST LOW 20 MIN: CPT | Mod: PBBFAC | Performed by: INTERNAL MEDICINE

## 2025-05-28 NOTE — PROGRESS NOTES
Chief Complaint: Crohn's disease     HPI: 34 y.o. male with jejunal Crohn's disease here for follow up. He had a small bowel enteroscopy which showed inflammation that was biopsied and confirmed to be Crohn's disease. The most recent colonoscopy 12/20/2023 showed normal mucosa of the TI and colon. Path without significant microscopic pathologic change in TI or colon.     EGD 2/2024 showed that the distal esophagus had an erosion consistent with reflux esophagitis which was biopsied. The stomach and duodenum had mild erythema and biopsies were obtained. Focally acute duodenitis with villous blunting was noted, no increase in intraepithelial lymphocytes, gastritis, h.pylori negative, rare intraepithelial eosinophils consistent with reflux esophagitis     MR enterography showed submucosal enhancement suspicious for proctitis but otherwise no abnormal bowel wall thickening.    CT in January of 2024 showed moderate length segment of small bowel within the left upper quadrant which demonstrates some wall thickening suspicious for enteritis.  There are mildly prominent nonspecific left upper quadrant mesenteric lymph nodes in the left periaortic lymph nodes which may be reactive.    VCE with gastritis and duodenitis, section of inflammation and mucosal erosion was noted at 25 minutes during the exam. Given the concern for Crohn's disease he was sent for small bowel enteroscopy at [facility] with Dr. Duncan. Enteroscopy showed duodenitis, erosions, erythema, friability and mucus, also jejunal inflammation charcharacterized by congestion (edema), erosions, erythema, friability and mucus. Path with severe chronic active enteritis with ulceration and mucosal remodeling, negative for granuloma, dysplasia or malignancy. Immunostain for CMV negative.    He endorses a history of intermittent abdominal pain that would come in waves and be pretty severe. He states that these symptoms would eventually resolve on their own. He  "has had several CRPs checked in the past which have been elevated starting approximately 4 months ago which went from 4 to 6 then to 9.  He was placed on mesalamine and budesonide and most recent CRP was 1.8. Calprotectin was checked and was 106.  [place] which showed colitis and that is when he was first diagnosed with ulcerative colitis.     Interval hx:  He is now in clinical and endoscopic remission on Humira 40 mg every 2 weeks monotherapy. Last trough level 14.6 in good range (goal 10-15). VCE shows endoscopic remission.     No abdominal pain. No nausea or vomiting. No diarrhea or blood in stool.     Doing well on current regimen. Overall feels great. Has gained weight.    No extraintestinal manifestations.    IBD History:   IBD disease: Crohn's disease  Disease location: Jejunum  Disease behavior: Inflammatory  Current therapy:  Humira 40 mg every 2 weeks monotherapy  Prior therapy: Mesalamine, budesonide  Surgeries: None  Complications: None  Extraintestinal manifestations: None    Past Medical History:  Crohn's disease-jejunal    Past Surgical History:  None    Allergies and Medications reviewed    Physical Examination:  /75   Pulse (!) 55   Ht 6' 1" (1.854 m)   Wt 96.9 kg (213 lb 9.6 oz)   SpO2 96%   BMI 28.18 kg/m²   General appearance: alert, cooperative, no distress  HENT: Normocephalic, atraumatic, neck symmetrical  Eyes: conjunctivae clear  Lungs: No labored breathing  Skin: No jaundice  Neurologic: Alert and oriented X 3    Most recent Labs/Stool studies:    Lab Results   Component Value Date    WBC 6.01 11/13/2024    HGB 13.1 (L) 11/13/2024    HCT 38.8 (L) 11/13/2024    MCV 97.7 (H) 11/13/2024     11/13/2024     CMP  Sodium   Date Value Ref Range Status   11/13/2024 136 136 - 145 mmol/L Final     Potassium   Date Value Ref Range Status   11/13/2024 4.0 3.5 - 5.1 mmol/L Final     Chloride   Date Value Ref Range Status   11/13/2024 103 98 - 107 " mmol/L Final     CO2   Date Value Ref Range Status   11/13/2024 27 22 - 29 mmol/L Final     Glucose   Date Value Ref Range Status   11/13/2024 89 74 - 100 mg/dL Final     BUN   Date Value Ref Range Status   11/13/2024 18 9 - 21 mg/dL Final     Creatinine   Date Value Ref Range Status   11/13/2024 1.19 0.72 - 1.25 mg/dL Final     Calcium   Date Value Ref Range Status   11/13/2024 9.4 8.4 - 10.2 mg/dL Final     Total Protein   Date Value Ref Range Status   11/13/2024 7.1 6.4 - 8.3 g/dL Final     Albumin   Date Value Ref Range Status   11/13/2024 4.3 3.5 - 5.0 g/dL Final     Bilirubin, Total   Date Value Ref Range Status   11/13/2024 0.9 <=1.5 mg/dL Final     Alk Phos   Date Value Ref Range Status   11/13/2024 47 40 - 150 U/L Final     AST   Date Value Ref Range Status   11/13/2024 37 (H) 5 - 34 U/L Final     ALT   Date Value Ref Range Status   11/13/2024 14 0 - 55 U/L Final     Anion Gap   Date Value Ref Range Status   11/13/2024 10 7 - 16 mmol/L Final     eGFR   Date Value Ref Range Status   11/13/2024 82 >=60 mL/min/1.73m2 Final       Lab Results   Component Value Date    CRP 0.29 07/30/2024     Most recent Imaging:  CTE:  1/15/25:Impression:     No evidence of active inflammatory bowel disease.     Most recent Endoscopy:   VCE: 3/31/25  -mild erosion in the proximal jejunum  -Capsule shows significant improvement and endoscopic remission    EGD:   Overall Impression: Performed forceps biopsies in the esophagus, stomach and duodenum. Esophagitis in the lower third of the esophagus; performed cold forceps biopsy. The distal esophagus had an erosion, consistent with reflux esophagitis. The distal esophagus was biopsied. The stomach and duodenal had mild erythema and biopsies were obtained.  Path:  A. Duodenum, biopsy:  - Focally acute duodenitis with villous blunting  - No increase in intraepithelial lymphocytes     B. Stomach, biopsy:  - Antral and oxyntic mucosa with mild chronic gastritis  - Helicobacter pylori  immunohistochemistry is negative     C. Esophagus, biopsy:  - Inflamed squamous mucosa with basal cell hyperplasia, spongiosis, focal parakeratosis, and rare intraepithelial eosinophils consistent with reflux esophagitis  - GMS stain is negative for fungal organisms      Colonoscopy: 2023  Performed forceps biopsies in the terminal ileum  Performed forceps biopsies in the ascending colon, transverse colon, descending colon, sigmoid colon and rectum  Mucosa of the colon and terminal ileum is normal appearing; multiple biopsies were obtained.  PATH:  A. Terminal ileum, biopsy:  No significant microscopic pathologic change     B. Ascending colon, biopsy:  No significant microscopic pathologic change     C. Transverse colon, biopsies:  No significant microscopic pathologic change     D. Descending colon, biopsy:  No significant microscopic pathologic change     E. Sigmoid colon, biopsies:  No significant microscopic pathologic change     F. Rectum, biopsies:  No significant microscopic pathologic change    Assessment and Plan: 34 y.o. male with    1. Jejunal Crohn's disease  2. In clinical and endoscopic remission  3. On Humira 40 mg every 2 weeks    Mr. Ty is now in clinical and endoscopic remission on Humira 40 mg every 2 weeks monotherapy. Will plan to perform repeat labs and CRP for routine monitoring. Adalimumab level and antibody is 14.6 and in adequate range (goal 10-15). Discussed health maintenance. Continue vitamin supplementation. He will follow up every 6 months.    30 minutes of total time spent on the encounter, which includes face to face time and non-face to face time preparing to see the patient (eg, review of tests), obtaining and/or reviewing separately obtained history, documenting clinical information in the electronic or other health record, Independently interpreting results (not separately reported) and communicating results to the patient/family/caregiver, or care coordination (not  separately reported).        ----------------------------------------------------------------------------------------------------------------------  Health Maintenance:   -Pneumonia: Recommend  -Flu Shot: Recommend annually  -Shingrix: Recommend    -Hepatitis A/Hepatitis B: Recommend  -Annual skin exam: Recommend   -Colon cancer screening: no colonic disease  -DEXA scan: Can consider in future  -Tobacco: Avoid  -Should avoid NSAIDs and narcotics, use only Tylenol for pain relief.     Follow up: RTC 6 months    Sai Sruthi Veerisetty, MD [facility]

## 2025-05-29 ENCOUNTER — TELEPHONE (OUTPATIENT)
Dept: GASTROENTEROLOGY | Facility: CLINIC | Age: 35
End: 2025-05-29
Payer: OTHER GOVERNMENT

## 2025-05-29 NOTE — TELEPHONE ENCOUNTER
----- Message from Kana Mcnair MD sent at 5/28/2025  9:31 PM CDT -----  Please notify patient that labs are in normal range.   ----- Message -----  From: Lab, Background User  Sent: 5/28/2025   3:53 PM CDT  To: Kana Mcnair MD

## 2025-06-02 ENCOUNTER — TELEPHONE (OUTPATIENT)
Dept: GASTROENTEROLOGY | Facility: CLINIC | Age: 35
End: 2025-06-02
Payer: OTHER GOVERNMENT

## 2025-06-13 DIAGNOSIS — K50.818 CROHN'S DISEASE OF BOTH SMALL AND LARGE INTESTINE WITH OTHER COMPLICATION: ICD-10-CM

## 2025-06-15 RX ORDER — ADALIMUMAB 40MG/0.4ML
KIT SUBCUTANEOUS
Qty: 2 PEN | Refills: 12 | Status: SHIPPED | OUTPATIENT
Start: 2025-06-15 | End: 2025-07-15